# Patient Record
Sex: FEMALE | Race: WHITE | NOT HISPANIC OR LATINO | Employment: UNEMPLOYED | URBAN - METROPOLITAN AREA
[De-identification: names, ages, dates, MRNs, and addresses within clinical notes are randomized per-mention and may not be internally consistent; named-entity substitution may affect disease eponyms.]

---

## 2017-01-23 ENCOUNTER — HOSPITAL ENCOUNTER (EMERGENCY)
Facility: CLINIC | Age: 37
Discharge: HOME | End: 2017-01-24
Attending: EMERGENCY MEDICINE | Admitting: EMERGENCY MEDICINE
Payer: COMMERCIAL

## 2017-01-23 DIAGNOSIS — Z04.9 OBSERVATION OR EVALUATION FOR SUSPECTED CONDITION: ICD-10-CM

## 2017-01-23 DIAGNOSIS — K62.89 RECTAL DISCOMFORT: ICD-10-CM

## 2017-01-23 PROCEDURE — 99285 EMERGENCY DEPT VISIT HI MDM: CPT

## 2017-01-23 PROCEDURE — 99284 EMERGENCY DEPT VISIT MOD MDM: CPT | Mod: Z6 | Performed by: EMERGENCY MEDICINE

## 2017-01-23 NOTE — ED AVS SNAPSHOT
[facility], [place], Emergency Department    [address]    [facility] [address]    Phone:  [phone]    Fax:  [phone]                                       Rubi Sepulveda   MRN: [id number]    Department:  [facility], Emergency Department   Date of Visit:  1/23/2017           After Visit Summary Signature Page     I have received my discharge instructions, and my questions have been answered. I have discussed any challenges I see with this plan with the nurse or doctor.    ..........................................................................................................................................  Patient/Patient Representative Signature      ..........................................................................................................................................  Patient Representative Print Name and Relationship to Patient    ..................................................               ................................................  Date                                            Time    ..........................................................................................................................................  Reviewed by Signature/Title    ...................................................              ..............................................  Date                                                            Time
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
## 2017-01-24 VITALS
DIASTOLIC BLOOD PRESSURE: 76 MMHG | TEMPERATURE: 97.6 F | OXYGEN SATURATION: 100 % | SYSTOLIC BLOOD PRESSURE: 117 MMHG | RESPIRATION RATE: 16 BRPM | HEART RATE: 91 BPM | WEIGHT: 102.38 LBS

## 2017-01-24 LAB
HCG UR QL: NORMAL
INTERNAL QC OK POCT: YES

## 2017-01-24 PROCEDURE — 81025 URINE PREGNANCY TEST: CPT | Performed by: EMERGENCY MEDICINE

## 2017-01-24 PROCEDURE — 25000125 ZZHC RX 250: Performed by: EMERGENCY MEDICINE

## 2017-01-24 PROCEDURE — 25900017 H RX MED GY IP 259 OP 259 PS 637: Performed by: EMERGENCY MEDICINE

## 2017-01-24 PROCEDURE — 25000132 ZZH RX MED GY IP 250 OP 250 PS 637: Performed by: EMERGENCY MEDICINE

## 2017-01-24 RX ORDER — METRONIDAZOLE 500 MG/1
2000 TABLET ORAL ONCE
Status: COMPLETED | OUTPATIENT
Start: 2017-01-24 | End: 2017-01-24

## 2017-01-24 RX ORDER — AZITHROMYCIN 250 MG/1
1000 TABLET, FILM COATED ORAL ONCE
Status: COMPLETED | OUTPATIENT
Start: 2017-01-24 | End: 2017-01-24

## 2017-01-24 RX ORDER — CEFTRIAXONE SODIUM 1 G
250 VIAL (EA) INJECTION ONCE
Status: DISCONTINUED | OUTPATIENT
Start: 2017-01-24 | End: 2017-01-24 | Stop reason: HOSPADM

## 2017-01-24 RX ORDER — LIDOCAINE HYDROCHLORIDE 10 MG/ML
INJECTION, SOLUTION INFILTRATION; PERINEURAL
Status: DISCONTINUED
Start: 2017-01-24 | End: 2017-01-24 | Stop reason: HOSPADM

## 2017-01-24 RX ORDER — EMTRICITABINE AND TENOFOVIR DISOPROXIL FUMARATE 200; 300 MG/1; MG/1
1 TABLET, FILM COATED ORAL DAILY
Qty: 25 TABLET | Refills: 0 | Status: SHIPPED | OUTPATIENT
Start: 2017-01-24 | End: 2017-01-26

## 2017-01-24 RX ORDER — LEVONORGESTREL 1.5 MG/1
1.5 TABLET ORAL ONCE
Status: DISCONTINUED | OUTPATIENT
Start: 2017-01-24 | End: 2017-01-24 | Stop reason: CLARIF

## 2017-01-24 RX ORDER — ONDANSETRON 4 MG/1
4 TABLET, ORALLY DISINTEGRATING ORAL ONCE
Status: COMPLETED | OUTPATIENT
Start: 2017-01-24 | End: 2017-01-24

## 2017-01-24 RX ADMIN — AZITHROMYCIN 1000 MG: 250 TABLET, FILM COATED ORAL at 01:24

## 2017-01-24 RX ADMIN — ULIPRISTAL ACETATE 30 MG: 30 TABLET ORAL at 01:41

## 2017-01-24 RX ADMIN — Medication 1 PACKAGE: at 01:28

## 2017-01-24 RX ADMIN — METRONIDAZOLE 2000 MG: 500 TABLET ORAL at 01:25

## 2017-01-24 RX ADMIN — ONDANSETRON 4 MG: 4 TABLET, ORALLY DISINTEGRATING ORAL at 01:24

## 2017-01-24 ASSESSMENT — ENCOUNTER SYMPTOMS
DIFFICULTY URINATING: 0
VOMITING: 0
CONFUSION: 0
CONSTIPATION: 1
ARTHRALGIAS: 0
FEVER: 0
NAUSEA: 0
ANAL BLEEDING: 0
DIARRHEA: 0
BLOOD IN STOOL: 0
ABDOMINAL PAIN: 0
COUGH: 0
RECTAL PAIN: 1
COLOR CHANGE: 0
SHORTNESS OF BREATH: 0
CHILLS: 0
SORE THROAT: 0
HEADACHES: 0

## 2017-01-24 NOTE — ED NOTES
Patient refused rocephin injection at this time. [facility] nurse notified. Reeducated patient regarding medication use. Patient understands medication use and states she does not want it.
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
## 2017-01-25 ENCOUNTER — TELEPHONE (OUTPATIENT)
Dept: NURSING | Facility: CLINIC | Age: 37
End: 2017-01-25

## 2017-01-25 PROCEDURE — 99283 EMERGENCY DEPT VISIT LOW MDM: CPT | Mod: Z6 | Performed by: EMERGENCY MEDICINE

## 2017-01-25 PROCEDURE — 99283 EMERGENCY DEPT VISIT LOW MDM: CPT | Performed by: EMERGENCY MEDICINE

## 2017-01-26 ENCOUNTER — HOSPITAL ENCOUNTER (EMERGENCY)
Facility: CLINIC | Age: 37
Discharge: HOME | End: 2017-01-26
Attending: EMERGENCY MEDICINE | Admitting: EMERGENCY MEDICINE
Payer: COMMERCIAL

## 2017-01-26 VITALS
HEART RATE: 87 BPM | RESPIRATION RATE: 16 BRPM | DIASTOLIC BLOOD PRESSURE: 76 MMHG | WEIGHT: 110 LBS | SYSTOLIC BLOOD PRESSURE: 117 MMHG | OXYGEN SATURATION: 96 %

## 2017-01-26 DIAGNOSIS — T74.21XA SEXUAL ASSAULT OF ADULT, INITIAL ENCOUNTER: ICD-10-CM

## 2017-01-26 LAB
HCG UR QL: NEGATIVE
INTERNAL QC OK POCT: YES

## 2017-01-26 PROCEDURE — 81025 URINE PREGNANCY TEST: CPT | Performed by: EMERGENCY MEDICINE

## 2017-01-26 RX ORDER — EMTRICITABINE AND TENOFOVIR DISOPROXIL FUMARATE 200; 300 MG/1; MG/1
1 TABLET, FILM COATED ORAL DAILY
Qty: 25 TABLET | Refills: 0 | Status: ON HOLD | OUTPATIENT
Start: 2017-01-26 | End: 2022-03-17
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
## 2017-01-26 NOTE — ED AVS SNAPSHOT
[facility], [place], Emergency Department    [address]    [facility] [address]    Phone:  [phone]    Fax:  [phone]                                       Rubi Sepulveda   MRN: [id number]    Department:  [facility], Emergency Department   Date of Visit:  1/25/2017           After Visit Summary Signature Page     I have received my discharge instructions, and my questions have been answered. I have discussed any challenges I see with this plan with the nurse or doctor.    ..........................................................................................................................................  Patient/Patient Representative Signature      ..........................................................................................................................................  Patient Representative Print Name and Relationship to Patient    ..................................................               ................................................  Date                                            Time    ..........................................................................................................................................  Reviewed by Signature/Title    ...................................................              ..............................................  Date                                                            Time

## 2017-01-26 NOTE — TELEPHONE ENCOUNTER
Call Type: Triage Call    Presenting Problem: Caller asking about prescriptions written 1-24-17  class: Local Print.  Thought they would be given pills.  Triage Note:  Guideline Title: Medication Questions - Adult  Recommended Disposition: Speak with Provider or Pharmacist  within 24 hours  Original Inclination: Would have called clinic  Override Disposition:  Intended Action: Follow advice given  Physician Contacted: No  Has questions about prescribed and/or nonprescribed medications not covered by  available resources ?  YES  Pregnant and has medication questions regarding prescribed and/or nonprescribed  medication(s) not covered by available resources ? NO  Breastfeeding and has medication questions regarding prescribed and/or  nonprescribed medication(s) not covered by available resources ? NO  Sign(s) or symptom(s) associated with a diagnosed condition or with a new illness  ? NO  Prescription ordered today and not available at pharmacy putting patient at  clinical risk ? NO  Recurrence of a symptom(s) or illness post prescribed medication treatment AND  provider instructed patient to call if symptom(s) returned. ? NO  Unable to obtain prescribed medication related to available resources AND  situation poses immediate clinical risk ? NO  Pharmacy calling to clarify prescription order. ? NO  Requests refill of prescribed medication that does NOT have a valid refill; lack  of medication may cause clinical risk to patient if not available. ? NO  Requests refill of prescribed medication without valid refills OR requests refill  of prescribed medication with valid refills but does not have prescription number  (no RX container); lack of medication does not put patient at clinical risk ? NO  Physician Instructions:  Care Advice:
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
## 2017-01-26 NOTE — ED NOTES
Pt was seen here for SARS exam a few days ago, did not take brochure for follow up information.  Pt given SARS brochure and shown where contact numbers were on brochure for follow up.

## 2020-09-05 ENCOUNTER — AMBULATORY - HEALTHEAST (OUTPATIENT)
Dept: BEHAVIORAL HEALTH | Facility: CLINIC | Age: 40
End: 2020-09-05

## 2020-09-06 ENCOUNTER — COMMUNICATION - HEALTHEAST (OUTPATIENT)
Dept: SCHEDULING | Facility: CLINIC | Age: 40
End: 2020-09-06

## 2020-10-21 ENCOUNTER — COMMUNICATION - HEALTHEAST (OUTPATIENT)
Dept: SCHEDULING | Facility: CLINIC | Age: 40
End: 2020-10-21

## 2022-02-28 ENCOUNTER — HOSPITAL ENCOUNTER (EMERGENCY)
Facility: HOSPITAL | Age: 42
Discharge: HOME | End: 2022-02-28
Attending: EMERGENCY MEDICINE | Admitting: EMERGENCY MEDICINE
Payer: COMMERCIAL

## 2022-02-28 VITALS
BODY MASS INDEX: 21.68 KG/M2 | RESPIRATION RATE: 16 BRPM | HEART RATE: 87 BPM | TEMPERATURE: 97.8 F | DIASTOLIC BLOOD PRESSURE: 84 MMHG | WEIGHT: 127 LBS | OXYGEN SATURATION: 99 % | HEIGHT: 64 IN | SYSTOLIC BLOOD PRESSURE: 126 MMHG

## 2022-02-28 DIAGNOSIS — Z32.02 PREGNANCY EXAMINATION OR TEST, NEGATIVE RESULT: ICD-10-CM

## 2022-02-28 PROBLEM — F60.3 BORDERLINE PERSONALITY DISORDER (H): Status: ACTIVE | Noted: 2021-11-17

## 2022-02-28 PROBLEM — F19.10 SUBSTANCE ABUSE (H): Status: ACTIVE | Noted: 2021-01-02

## 2022-02-28 PROBLEM — R45.851 SUICIDAL IDEATION: Status: ACTIVE | Noted: 2020-09-05

## 2022-02-28 PROBLEM — F43.10 PTSD (POST-TRAUMATIC STRESS DISORDER): Status: ACTIVE | Noted: 2021-01-02

## 2022-02-28 PROBLEM — F31.2 BIPOLAR DISORDER, CURRENT EPISODE MANIC SEVERE WITH PSYCHOTIC FEATURES (H): Status: ACTIVE | Noted: 2019-03-08

## 2022-02-28 PROBLEM — F41.9 ANXIETY: Status: ACTIVE | Noted: 2018-04-10

## 2022-02-28 PROBLEM — F15.20 METHAMPHETAMINE USE DISORDER, SEVERE (H): Status: ACTIVE | Noted: 2021-11-17

## 2022-02-28 LAB
ALBUMIN UR-MCNC: 10 MG/DL
AMORPH CRY #/AREA URNS HPF: ABNORMAL /HPF
APPEARANCE UR: CLEAR
BILIRUB UR QL STRIP: NEGATIVE
COLOR UR AUTO: ABNORMAL
GLUCOSE UR STRIP-MCNC: NEGATIVE MG/DL
HCG UR QL: NEGATIVE
HGB UR QL STRIP: NEGATIVE
INTERNAL QC OK POCT: NORMAL
KETONES UR STRIP-MCNC: NEGATIVE MG/DL
LEUKOCYTE ESTERASE UR QL STRIP: ABNORMAL
MUCOUS THREADS #/AREA URNS LPF: PRESENT /LPF
NITRATE UR QL: NEGATIVE
PH UR STRIP: 7 [PH] (ref 5–7)
POCT KIT EXPIRATION DATE: NORMAL
POCT KIT LOT NUMBER: NORMAL
RBC URINE: 2 /HPF
SP GR UR STRIP: 1.03 (ref 1–1.03)
SQUAMOUS EPITHELIAL: 1 /HPF
UROBILINOGEN UR STRIP-MCNC: <2 MG/DL
WBC URINE: 8 /HPF

## 2022-02-28 PROCEDURE — 81025 URINE PREGNANCY TEST: CPT | Performed by: EMERGENCY MEDICINE

## 2022-02-28 PROCEDURE — 99283 EMERGENCY DEPT VISIT LOW MDM: CPT

## 2022-02-28 PROCEDURE — 81001 URINALYSIS AUTO W/SCOPE: CPT | Performed by: EMERGENCY MEDICINE

## 2022-02-28 ASSESSMENT — ENCOUNTER SYMPTOMS
FREQUENCY: 1
CONSTIPATION: 0
FEVER: 0
ABDOMINAL PAIN: 1
SHORTNESS OF BREATH: 0
DYSURIA: 0
BLOOD IN STOOL: 0
DIARRHEA: 0
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
## 2022-03-01 ENCOUNTER — PATIENT OUTREACH (OUTPATIENT)
Dept: CARE COORDINATION | Facility: CLINIC | Age: 42
End: 2022-03-01
Payer: COMMERCIAL

## 2022-03-01 DIAGNOSIS — Z71.89 OTHER SPECIFIED COUNSELING: ICD-10-CM

## 2022-03-01 NOTE — PROGRESS NOTES
Clinic Care Coordination Contact    Background: Care Coordination referral placed from hospitals discharge report for reason of patient meeting criteria for a TCM outreach call by [facility] team.    Assessment: Upon chart review, [facility] Team member will cancel/close the referral for TCM outreach due to reason below:    Patient does not have a valid phone number.    Plan: Care Coordination referral for TCM outreach canceled.    ESTHER Gale  [facility] [place], [facility]

## 2022-03-08 ENCOUNTER — HOSPITAL ENCOUNTER (EMERGENCY)
Facility: HOSPITAL | Age: 42
Discharge: PSYCHIATRIC OR SUBSTANCE USE FACILITY | End: 2022-03-09
Attending: EMERGENCY MEDICINE | Admitting: EMERGENCY MEDICINE
Payer: COMMERCIAL

## 2022-03-08 DIAGNOSIS — N30.00 ACUTE CYSTITIS WITHOUT HEMATURIA: ICD-10-CM

## 2022-03-08 DIAGNOSIS — R46.89 AGGRESSIVE BEHAVIOR: ICD-10-CM

## 2022-03-08 DIAGNOSIS — R45.86 LABILE MOOD: ICD-10-CM

## 2022-03-08 PROBLEM — F12.10 CANNABIS ABUSE: Status: ACTIVE | Noted: 2017-04-21

## 2022-03-08 LAB
ALBUMIN SERPL-MCNC: 3.6 G/DL (ref 3.5–5)
ALP SERPL-CCNC: 94 U/L (ref 45–120)
ALT SERPL W P-5'-P-CCNC: 22 U/L (ref 0–45)
ANION GAP SERPL CALCULATED.3IONS-SCNC: 13 MMOL/L (ref 5–18)
AST SERPL W P-5'-P-CCNC: 25 U/L (ref 0–40)
BILIRUB SERPL-MCNC: 0.6 MG/DL (ref 0–1)
BUN SERPL-MCNC: 11 MG/DL (ref 8–22)
CALCIUM SERPL-MCNC: 9.3 MG/DL (ref 8.5–10.5)
CHLORIDE BLD-SCNC: 104 MMOL/L (ref 98–107)
CK SERPL-CCNC: 323 U/L (ref 30–190)
CO2 SERPL-SCNC: 22 MMOL/L (ref 22–31)
CREAT SERPL-MCNC: 0.8 MG/DL (ref 0.6–1.1)
ERYTHROCYTE [DISTWIDTH] IN BLOOD BY AUTOMATED COUNT: 13.6 % (ref 10–15)
ETHANOL SERPL-MCNC: <10 MG/DL
GFR SERPL CREATININE-BSD FRML MDRD: >90 ML/MIN/1.73M2
GLUCOSE BLD-MCNC: 90 MG/DL (ref 70–125)
HCT VFR BLD AUTO: 42.7 % (ref 35–47)
HGB BLD-MCNC: 14.5 G/DL (ref 11.7–15.7)
HOLD SPECIMEN: NORMAL
HOLD SPECIMEN: NORMAL
MCH RBC QN AUTO: 31.3 PG (ref 26.5–33)
MCHC RBC AUTO-ENTMCNC: 34 G/DL (ref 31.5–36.5)
MCV RBC AUTO: 92 FL (ref 78–100)
PLATELET # BLD AUTO: 453 10E3/UL (ref 150–450)
POTASSIUM BLD-SCNC: 4 MMOL/L (ref 3.5–5)
PROT SERPL-MCNC: 6.8 G/DL (ref 6–8)
RBC # BLD AUTO: 4.63 10E6/UL (ref 3.8–5.2)
SODIUM SERPL-SCNC: 139 MMOL/L (ref 136–145)
WBC # BLD AUTO: 15.6 10E3/UL (ref 4–11)

## 2022-03-08 PROCEDURE — 80053 COMPREHEN METABOLIC PANEL: CPT | Performed by: EMERGENCY MEDICINE

## 2022-03-08 PROCEDURE — 96360 HYDRATION IV INFUSION INIT: CPT

## 2022-03-08 PROCEDURE — C9803 HOPD COVID-19 SPEC COLLECT: HCPCS

## 2022-03-08 PROCEDURE — 36415 COLL VENOUS BLD VENIPUNCTURE: CPT | Performed by: EMERGENCY MEDICINE

## 2022-03-08 PROCEDURE — 250N000013 HC RX MED GY IP 250 OP 250 PS 637: Performed by: EMERGENCY MEDICINE

## 2022-03-08 PROCEDURE — 258N000003 HC RX IP 258 OP 636: Performed by: EMERGENCY MEDICINE

## 2022-03-08 PROCEDURE — 82077 ASSAY SPEC XCP UR&BREATH IA: CPT | Performed by: EMERGENCY MEDICINE

## 2022-03-08 PROCEDURE — 99285 EMERGENCY DEPT VISIT HI MDM: CPT | Mod: 25

## 2022-03-08 PROCEDURE — 96361 HYDRATE IV INFUSION ADD-ON: CPT

## 2022-03-08 PROCEDURE — 85014 HEMATOCRIT: CPT | Performed by: EMERGENCY MEDICINE

## 2022-03-08 PROCEDURE — 82550 ASSAY OF CK (CPK): CPT | Performed by: EMERGENCY MEDICINE

## 2022-03-08 RX ORDER — SODIUM CHLORIDE 9 MG/ML
INJECTION, SOLUTION INTRAVENOUS ONCE
Status: COMPLETED | OUTPATIENT
Start: 2022-03-08 | End: 2022-03-09

## 2022-03-08 RX ORDER — ARIPIPRAZOLE 10 MG/1
10 TABLET ORAL ONCE
Status: COMPLETED | OUTPATIENT
Start: 2022-03-08 | End: 2022-03-08

## 2022-03-08 RX ADMIN — SODIUM CHLORIDE: 9 INJECTION, SOLUTION INTRAVENOUS at 20:42

## 2022-03-08 RX ADMIN — ARIPIPRAZOLE 10 MG: 10 TABLET ORAL at 20:42

## 2022-03-09 ENCOUNTER — HOSPITAL ENCOUNTER (INPATIENT)
Facility: CLINIC | Age: 42
LOS: 8 days | Discharge: NURSING, REHAB OR LONG-TERM CARE | DRG: 885 | End: 2022-03-17
Attending: PSYCHIATRY & NEUROLOGY | Admitting: PSYCHIATRY & NEUROLOGY
Payer: COMMERCIAL

## 2022-03-09 ENCOUNTER — TELEPHONE (OUTPATIENT)
Dept: BEHAVIORAL HEALTH | Facility: CLINIC | Age: 42
End: 2022-03-09
Payer: COMMERCIAL

## 2022-03-09 VITALS
DIASTOLIC BLOOD PRESSURE: 71 MMHG | OXYGEN SATURATION: 98 % | HEART RATE: 91 BPM | HEIGHT: 64 IN | TEMPERATURE: 98.1 F | WEIGHT: 127 LBS | BODY MASS INDEX: 21.68 KG/M2 | SYSTOLIC BLOOD PRESSURE: 110 MMHG | RESPIRATION RATE: 20 BRPM

## 2022-03-09 DIAGNOSIS — F25.9 SCHIZOPHRENIA, SCHIZOAFFECTIVE, CHRONIC WITH ACUTE EXACERBATION (H): Primary | ICD-10-CM

## 2022-03-09 LAB
ALBUMIN UR-MCNC: 30 MG/DL
AMPHETAMINES UR QL SCN: ABNORMAL
APPEARANCE UR: ABNORMAL
BARBITURATES UR QL: ABNORMAL
BENZODIAZ UR QL: ABNORMAL
BILIRUB UR QL STRIP: NEGATIVE
CANNABINOIDS UR QL SCN: ABNORMAL
COCAINE UR QL: ABNORMAL
COLOR UR AUTO: YELLOW
CREAT UR-MCNC: 171 MG/DL
GLUCOSE UR STRIP-MCNC: NEGATIVE MG/DL
HGB UR QL STRIP: ABNORMAL
HYALINE CASTS: 8 /LPF
KETONES UR STRIP-MCNC: ABNORMAL MG/DL
LEUKOCYTE ESTERASE UR QL STRIP: ABNORMAL
METHADONE UR QL SCN: ABNORMAL
MUCOUS THREADS #/AREA URNS LPF: PRESENT /LPF
NITRATE UR QL: NEGATIVE
OPIATES UR QL SCN: ABNORMAL
OXYCODONE UR QL: ABNORMAL
PCP UR QL SCN: ABNORMAL
PH UR STRIP: 6 [PH] (ref 5–7)
RBC URINE: 22 /HPF
SARS-COV-2 RNA RESP QL NAA+PROBE: NEGATIVE
SP GR UR STRIP: 1.02 (ref 1–1.03)
SQUAMOUS EPITHELIAL: 36 /HPF
UROBILINOGEN UR STRIP-MCNC: <2 MG/DL
WBC URINE: 116 /HPF

## 2022-03-09 PROCEDURE — 250N000013 HC RX MED GY IP 250 OP 250 PS 637: Performed by: EMERGENCY MEDICINE

## 2022-03-09 PROCEDURE — 128N000001 HC R&B CD/MH ADULT

## 2022-03-09 PROCEDURE — 80307 DRUG TEST PRSMV CHEM ANLYZR: CPT | Performed by: EMERGENCY MEDICINE

## 2022-03-09 PROCEDURE — 87635 SARS-COV-2 COVID-19 AMP PRB: CPT | Performed by: EMERGENCY MEDICINE

## 2022-03-09 PROCEDURE — 81003 URINALYSIS AUTO W/O SCOPE: CPT | Performed by: EMERGENCY MEDICINE

## 2022-03-09 PROCEDURE — 87086 URINE CULTURE/COLONY COUNT: CPT | Performed by: EMERGENCY MEDICINE

## 2022-03-09 RX ORDER — NITROFURANTOIN 25; 75 MG/1; MG/1
100 CAPSULE ORAL EVERY 12 HOURS SCHEDULED
Status: DISCONTINUED | OUTPATIENT
Start: 2022-03-09 | End: 2022-03-09 | Stop reason: HOSPADM

## 2022-03-09 RX ORDER — HYDROXYZINE HYDROCHLORIDE 25 MG/1
25 TABLET, FILM COATED ORAL 3 TIMES DAILY PRN
Status: DISCONTINUED | OUTPATIENT
Start: 2022-03-09 | End: 2022-03-10

## 2022-03-09 RX ORDER — HALOPERIDOL 5 MG/ML
2 INJECTION INTRAMUSCULAR EVERY 6 HOURS PRN
Status: DISCONTINUED | OUTPATIENT
Start: 2022-03-09 | End: 2022-03-17 | Stop reason: HOSPADM

## 2022-03-09 RX ORDER — NITROFURANTOIN MACROCRYSTAL 100 MG
100 CAPSULE ORAL EVERY 12 HOURS
Status: ON HOLD | COMMUNITY
End: 2022-03-17

## 2022-03-09 RX ORDER — HALOPERIDOL 1 MG/1
2 TABLET ORAL EVERY 6 HOURS PRN
Status: DISCONTINUED | OUTPATIENT
Start: 2022-03-09 | End: 2022-03-17 | Stop reason: HOSPADM

## 2022-03-09 RX ADMIN — NITROFURANTOIN MONOHYDRATE/MACROCRYSTALLINE 100 MG: 25; 75 CAPSULE ORAL at 19:01

## 2022-03-09 RX ADMIN — NITROFURANTOIN MONOHYDRATE/MACROCRYSTALLINE 100 MG: 25; 75 CAPSULE ORAL at 07:11

## 2022-03-09 ASSESSMENT — ACTIVITIES OF DAILY LIVING (ADL)
WEAR_GLASSES_OR_BLIND: NO
CONCENTRATING,_REMEMBERING_OR_MAKING_DECISIONS_DIFFICULTY: NO
DOING_ERRANDS_INDEPENDENTLY_DIFFICULTY: NO
FALL_HISTORY_WITHIN_LAST_SIX_MONTHS: NO
CHANGE_IN_FUNCTIONAL_STATUS_SINCE_ONSET_OF_CURRENT_ILLNESS/INJURY: NO
DRESSING/BATHING_DIFFICULTY: OTHER (SEE COMMENTS)
WALKING_OR_CLIMBING_STAIRS_DIFFICULTY: NO
TOILETING_ISSUES: NO
DIFFICULTY_EATING/SWALLOWING: NO

## 2022-03-09 NOTE — ED NOTES
Florence from DEC called for report before video conf. In with pt for behavioral health consult.

## 2022-03-09 NOTE — PLAN OF CARE
"Patient slept most of shift. Up for breakfast and lunch and then went back to bed. Patient eating and drinking well, SBA assist to the bathroom with void x3. Patient allowed cares ie blood pressure to be taken, head to toe assessment. Deflected most questions being asked and seemed to become more anxious with any probing questions. Let needs be known, asked to use restroom, asked for blankets and juice. Has dry cough and some nasal congestion. Afebrile, HR tachycardic 100-115, BP WNL. Complained of Lip pain and asked for ice cream. Rn asked to assess inside of mouth patient declined and stated \"no I am fine now\" and refused assessment. Skin on lip seems externally intact.                      "
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
## 2022-03-09 NOTE — ED PROVIDER NOTES
"EMERGENCY DEPARTMENT ENCOUNTER      NAME: Rubi Sepulveda  AGE: 42 year old female  YOB: 1980  MRN: [id number]  EVALUATION DATE & TIME: 3/8/2022  7:48 PM    PCP: No Ref-Primary, Physician    ED PROVIDER: Pako Burris M.D.      Chief Complaint   Patient presents with     Altered Mental Status       FINAL IMPRESSION:  Mood disorder    ED COURSE & MEDICAL DECISION MAKING:    Pertinent Labs & Imaging studies reviewed. (See chart for details)  42 year old female presents to the Emergency Department for evaluation of confusion.  Per report patient was noticed to be acting erratically at a local gas station.  PD was called and they referred the case to EMS.  Patient arrives reporting \"I forgotten everything\".  Patient goes on to relate that she does not recall much of the last 5 years of her life.  She cannot say where she came from or why she is here.  Patient does admit to prior methamphetamine use but reports none for a number of days.  She does agree with reports of prior bipolar disorder.  Uncertain if she has been taking her meds.  Patient agrees with assessment that she is slightly out of control potentially consistent with her bipolar disorder.  She reports Abilify typically works when she has problems.  On exam she is very tangent and slightly agitated.  She can be refocused fairly easily.  No overt hallucinations.  We will proceed with Abilify, baseline blood work and drug screen.  Review of records indicate recent similar presentation at outside hospital related to methamphetamine use.  Patient responded well to Zyprexa and was dismissed after resting.  No outward signs of injury to suggest need for imaging    7:49 PM I met with the patient for the initial interview and physical examination. Discussed plan for treatment and workup in the ED.  9:56 PM Rechecked on patient. Updated on results. She is much calmer, lying on her side in a position of comfort.  10:45 PM.  Patient be monitored through " "the night.  Will require reevaluation in the morning potential DEC evaluation if she remains disjointed.  Laboratory evaluation reassuring.  CPK mildly elevated could relate to hypomania versus methamphetamine use.  No evidence of renal dysfunction.  11:20 PM.  Patient discussed with Dr. Escobar.  She is assuming care.  Patient will require reevaluation the morning    At the conclusion of the encounter I discussed the results of all of the tests and the disposition. The questions were answered and return precautions provided. The patient or family acknowledged understanding and was agreeable with the care plan.       PPE: Provider wore gloves, N95 mask, eye protection, surgical cap.     MEDICATIONS GIVEN IN THE EMERGENCY:  Medications   ARIPiprazole (ABILIFY) tablet 10 mg (10 mg Oral Given 3/8/22 2042)   sodium chloride 0.9% infusion ( Intravenous Rate/Dose Verify 3/8/22 2212)       NEW PRESCRIPTIONS STARTED AT TODAY'S ER VISIT  New Prescriptions    No medications on file     =================================================================    HPI    History limited by altered mental status.  Patient information was obtained from: Patient, RN    Use of Intrepreter: N/A      Rubi Sepulveda is a 42 year old female with a pertient medical history of anxiety, schizoaffective schizophrenia, cannabis abuse, methamphetamine abuse, who presents to the ED for evaluation of altered mental status. Patient presents by EMS. She was seen acting erratically at a gas station and the  were called. EMS was dispatched for a mental health concern to bring patient into the ED. Patient states that she \"can't remember at least the last 5 years of my life.\" She does know that she was brought in by EMS. When asked how she has been sleeping recently, she states \"I don't know.\" Endorses methamphetamine once within the last 2 weeks. Patient is a confused historian and has trouble with her chain of thought.      REVIEW OF SYSTEMS   ROS " unable to attain due to altered mental status.    PAST MEDICAL HISTORY:  No past medical history on file.    PAST SURGICAL HISTORY:  No past surgical history on file.      CURRENT MEDICATIONS:    No current facility-administered medications for this encounter.    Current Outpatient Medications:      ARIPiprazole (ABILIFY) 10 MG tablet, Take 1 tablet (10 mg) by mouth daily, Disp: 30 tablet, Rfl: 0     clonazePAM (KLONOPIN) 0.5 MG tablet, Take 1 tablet (0.5 mg) by mouth 2 times daily, Disp: 60 tablet, Rfl: 0     dolutegravir (TIVICAY) 50 MG tablet, Take 1 tablet (50 mg) by mouth daily, Disp: 25 tablet, Rfl: 0     emtricitabine-tenofovir (TRUVADA) 200-300 MG per tablet, Take 1 tablet by mouth daily, Disp: 25 tablet, Rfl: 0     QUEtiapine (SEROQUEL) 400 MG tablet, Take 2.5 tablets (1000 mg) by mouth every night at bedtime., Disp: 75 tablet, Rfl: 0    ALLERGIES:  Allergies   Allergen Reactions     Ibuprofen Nausea and Vomiting     Seasonal Allergies        FAMILY HISTORY:  No family history on file.    SOCIAL HISTORY:   Social History     Socioeconomic History     Marital status: Single     Spouse name: Not on file     Number of children: Not on file     Years of education: Not on file     Highest education level: Not on file   Occupational History     Not on file   Tobacco Use     Smoking status: Unknown If Ever Smoked     Smokeless tobacco: Not on file   Substance and Sexual Activity     Alcohol use: Yes     Alcohol/week: 3.0 standard drinks     Drug use: Not Currently     Types: Other     Comment: Drug use: ASHVIN     Sexual activity: Yes     Partners: Male     Birth control/protection: None   Other Topics Concern     Not on file   Social History Narrative     Not on file     Social Determinants of Health     Financial Resource Strain: Not on file   Food Insecurity: Not on file   Transportation Needs: Not on file   Physical Activity: Not on file   Stress: Not on file   Social Connections: Not on file   Intimate Partner  "Violence: Not on file   Housing Stability: Not on file       VITALS:  Patient Vitals for the past 24 hrs:   BP Temp Temp src Pulse Resp SpO2 Height Weight   03/08/22 2000 136/71 -- -- 109 -- 98 % -- --   03/08/22 1954 117/86 98.2  F (36.8  C) Oral 107 16 98 % 1.626 m (5' 4\") 57.6 kg (127 lb)        PHYSICAL EXAM    Constitutional:  Awake, alert, in no apparent distress  HENT:  Normocephalic, Atraumatic. Bilateral external ears normal. Oropharynx moist. Nose normal. Neck- Normal range of motion with no guarding, No midline cervical tenderness, Supple, No stridor.   Eyes:  PERRL, EOMI with no signs of entrapment, Conjunctiva injected, No discharge.   Respiratory:  Normal breath sounds, No respiratory distress, No wheezing.    Cardiovascular:  Normal heart rate, Normal rhythm, No appreciable rubs or gallops.   GI:  Soft, No tenderness, No distension, No palpable masses  Musculoskeletal:  Intact distal pulses, No edema. Good range of motion in all major joints. No tenderness to palpation or major deformities noted.  Integument:  Warm, Dry, No erythema, No rash. Face slightly flushed.  Neurologic:  Alert to person, Normal motor function, Normal sensory function, No focal deficits noted.   Psychiatric:   Pressured speech. Chain of thought is fleeting. Patient is a confused historian, though she does have some insight.  Minimal paranoia      LAB:  All pertinent labs reviewed and interpreted.  Results for orders placed or performed during the hospital encounter of 03/08/22   Comprehensive metabolic panel     Status: Normal   Result Value Ref Range    Sodium 139 136 - 145 mmol/L    Potassium 4.0 3.5 - 5.0 mmol/L    Chloride 104 98 - 107 mmol/L    Carbon Dioxide (CO2) 22 22 - 31 mmol/L    Anion Gap 13 5 - 18 mmol/L    Urea Nitrogen 11 8 - 22 mg/dL    Creatinine 0.80 0.60 - 1.10 mg/dL    Calcium 9.3 8.5 - 10.5 mg/dL    Glucose 90 70 - 125 mg/dL    Alkaline Phosphatase 94 45 - 120 U/L    AST 25 0 - 40 U/L    ALT 22 0 - 45 U/L "    Protein Total 6.8 6.0 - 8.0 g/dL    Albumin 3.6 3.5 - 5.0 g/dL    Bilirubin Total 0.6 0.0 - 1.0 mg/dL    GFR Estimate >90 >60 mL/min/1.73m2   CBC (+ platelets, no diff)     Status: Abnormal   Result Value Ref Range    WBC Count 15.6 (H) 4.0 - 11.0 10e3/uL    RBC Count 4.63 3.80 - 5.20 10e6/uL    Hemoglobin 14.5 11.7 - 15.7 g/dL    Hematocrit 42.7 35.0 - 47.0 %    MCV 92 78 - 100 fL    MCH 31.3 26.5 - 33.0 pg    MCHC 34.0 31.5 - 36.5 g/dL    RDW 13.6 10.0 - 15.0 %    Platelet Count 453 (H) 150 - 450 10e3/uL   Alcohol level blood     Status: Normal   Result Value Ref Range    Alcohol, Blood <10 None detected mg/dL   CK total     Status: Abnormal   Result Value Ref Range     (H) 30 - 190 U/L   Urine Drugs of Abuse Screen Panel 1+ - Drug Screen plus Methadone     Status: None ()    Narrative    The following orders were created for panel order Urine Drugs of Abuse Screen Panel 1+ - Drug Screen plus Methadone.  Procedure                               Abnormality         Status                     ---------                               -----------         ------                     Drugs of Abuse 1+ Panel,...[[id number]]                                                   Please view results for these tests on the individual orders.       RADIOLOGY:  Reviewed all pertinent imaging. Please see official radiology report.  No orders to display       I, Tee Anderson, am serving as a scribe to document services personally performed by Pako Burris MD, based on my observation and the provider's statements to me. I, Pako Burris MD attest that Tee Anderson is acting in a scribe capacity, has observed my performance of the services and has documented them in accordance with my direction.    Pako Burris M.D.  Emergency Medicine  [facility] EMERGENCY DEPARTMENT     Pako Burris MD  03/08/22 7606

## 2022-03-09 NOTE — ED NOTES
andres called. She stated she called dec and no one took collateral from her. She has called er 3 times this am to give information. Gave her number to dec again to try and give information. Let her know that dec would have more information about placement then I do. She is going to try dec again.
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
## 2022-03-09 NOTE — TELEPHONE ENCOUNTER
"S: 06:03 - Received call from On-call supervisor, Beatrice to approve a DEC bypass on a 42/F in [place] ED for keke. Follow-up questions/clinical from Dr. Escobar, ED MD    B: BIB EMS after pt was found acting erratically at a local gas station. Pt presents to the ED as confused and agitated w/ tangential and pressured speech. Pt reports she \"can't remember the last 5 years of my life.\" Unclear if pt has been med-compliant. Pt reports meth use within the last 2 weeks - UDS positive for amphetamines and cannabis. Pt has an ACT team. Most recent IP admission at [facility] in Sept of 2020. No known acute medical concerns. Hx of schizoaffective d/o, bipolar d/o, TIMBO, BPD and PTSD.     B: Vol     UA: See chart - abnormal results  Urine Culture processing  CK Total 323  CMP WNL  CBC: WBC 15.6 / Platelet Count 453  Covid test needs to be ordered and collected    R: Called ED @ 06:40 re: Covid test collection and follow-up questions. Spoke w/ Dr. Escobar, who reports pt medically cleared for [facility] admission and that pt WBC slightly elevated and will treat pt for UTI as UA abnormal. RN reports that pt is able to ambulate, eat and drink independently. ED to order/collect Covid test. Pt placed on work list until appropriate placement is available     Patient cleared and ready for behavioral bed placement: Yes            "
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
## 2022-03-09 NOTE — ED NOTES
Requested Urine sample from Pt.  Pt does not have to urinate at this time.  PT received 693 mL of NS, drank 8 oz of milk, this should help facilitate a urine sample.

## 2022-03-09 NOTE — ED NOTES
1:1 continued with pt. Pt became agitated with DEC , yelling and began pushing iPad across room. Hung up call with  and laid back down in bed.

## 2022-03-09 NOTE — ED TRIAGE NOTES
Pt brought in by [facility] for erratic behavior.  Per EMS report Pt acting erratic per witness statements EMS dispatched to have PT evaluated at hospital.

## 2022-03-09 NOTE — ED NOTES
Bed: JNED-20  Expected date: 3/8/22  Expected time: 7:37 PM  Means of arrival: Ambulance  Comments:  Sheyla - Psych

## 2022-03-09 NOTE — ED NOTES
Pt ate breakfast and lunch. Sitter verbalized no concerns. Pt resting comfortably each time rounded during shift.

## 2022-03-09 NOTE — PROGRESS NOTES
"Olinda from ACT Team at [facility] located in [place] called since she currently works with Rubi. They have been working with her for her issues with housing and with substance abuse. They are \"hopeful she can be admitted for psych cares. Please call me for any questions or discharge planning needs\".    Olinda Rodríguez Cell [phone].  "

## 2022-03-09 NOTE — PLAN OF CARE
Problem: UTI (Urinary Tract Infection)  Goal: Improved Infection Symptoms  Outcome: Ongoing, Progressing     Problem: Activity and Energy Impairment (Anxiety Signs/Symptoms)  Goal: Optimized Energy Level (Anxiety Signs/Symptoms)  Outcome: Ongoing, Progressing     Problem: Cognitive Impairment (Anxiety Signs/Symptoms)  Goal: Optimized Cognitive Function (Anxiety Signs/Symptoms)  Outcome: Ongoing, Progressing  Flowsheets (Taken 3/9/2022 1019)  Mutually Determined Action Steps (Optimized Cognitive Function): participates in cognitive restructuring     Problem: Mood Impairment (Anxiety Signs/Symptoms)  Goal: Improved Mood Symptoms (Anxiety Signs/Symptoms)  Outcome: Ongoing, Progressing  Flowsheets (Taken 3/9/2022 1019)  Mutually Determined Action Steps (Improved Mood Symptoms):   names internal triggers   names external triggers     Problem: Sleep Impairment (Anxiety Signs/Symptoms)  Goal: Improved Sleep (Anxiety Signs/Symptoms)  Outcome: Ongoing, Progressing  Flowsheets (Taken 3/9/2022 1019)  Mutually Determined Action Steps (Improved Sleep): identifies disturbance factors     Problem: Somatic Disturbance (Anxiety Signs/Symptoms)  Goal: Improved Somatic Symptoms (Anxiety Signs/Symptoms)  Outcome: Ongoing, Progressing  Flowsheets (Taken 3/9/2022 1019)  Mutually Determined Action Steps (Improved Somatic Symptoms): rates anxiety level via scale   Goal Outcome Evaluation:  Patient calm and cooperative at this time. Up to bedside to eat breakfast, SBA to bathroom. Making needs known and asking for help currently.

## 2022-03-09 NOTE — ED PROVIDER NOTES
"[facility] ED Mental Health Handoff Note:     Assuming care from: Dr Bryanna Escobar    Brief HPI: 42 year old female signed out to me by the above provider. See initial ED Provider note for full details of the presentation.   In brief, patient with a pertient medical history of anxiety, schizoaffective schizophrenia, cannabis abuse, methamphetamine abuse, who presents to the ED for evaluation of altered mental status. Patient presents by EMS. She was seen acting erratically at a gas station and the  were called. EMS was dispatched for a mental health concern to bring patient into the ED. Patient states that she \"can't remember at least the last 5 years of my life.\" She does know that she was brought in by EMS. When asked how she has been sleeping recently, she states \"I don't know.\" Endorses methamphetamine once within the last 2 weeks. Patient is a confused historian and has trouble with her chain of thought.      Home meds reviewed and ordered/administered: Yes  Medically stable for inpatient mental health admission: Yes.  Evaluated by mental health: No- unable to participate.  DEC bypass  Safety concerns: At the time I received sign out, there were no safety concerns.    Hold Status:  Active Orders   N/A     ED Course:  7:10 AM signout from Dr. Escobar  *** patient signed out to oncoming physician.     Impression:    ICD-10-CM    1. Aggressive behavior  R46.89    2. Labile mood  R45.86    3. Acute cystitis without hematuria  N30.00           Labs/Imaging:   Results for orders placed or performed during the hospital encounter of 03/08/22 (from the past 24 hour(s))   Comprehensive metabolic panel     Status: Normal    Collection Time: 03/08/22  9:10 PM   Result Value Ref Range    Sodium 139 136 - 145 mmol/L    Potassium 4.0 3.5 - 5.0 mmol/L    Chloride 104 98 - 107 mmol/L    Carbon Dioxide (CO2) 22 22 - 31 mmol/L    Anion Gap 13 5 - 18 mmol/L    Urea Nitrogen 11 8 - 22 mg/dL    Creatinine 0.80 0.60 - 1.10 " mg/dL    Calcium 9.3 8.5 - 10.5 mg/dL    Glucose 90 70 - 125 mg/dL    Alkaline Phosphatase 94 45 - 120 U/L    AST 25 0 - 40 U/L    ALT 22 0 - 45 U/L    Protein Total 6.8 6.0 - 8.0 g/dL    Albumin 3.6 3.5 - 5.0 g/dL    Bilirubin Total 0.6 0.0 - 1.0 mg/dL    GFR Estimate >90 >60 mL/min/1.73m2   CBC (+ platelets, no diff)     Status: Abnormal    Collection Time: 03/08/22  9:10 PM   Result Value Ref Range    WBC Count 15.6 (H) 4.0 - 11.0 10e3/uL    RBC Count 4.63 3.80 - 5.20 10e6/uL    Hemoglobin 14.5 11.7 - 15.7 g/dL    Hematocrit 42.7 35.0 - 47.0 %    MCV 92 78 - 100 fL    MCH 31.3 26.5 - 33.0 pg    MCHC 34.0 31.5 - 36.5 g/dL    RDW 13.6 10.0 - 15.0 %    Platelet Count 453 (H) 150 - 450 10e3/uL   Alcohol level blood     Status: Normal    Collection Time: 03/08/22  9:10 PM   Result Value Ref Range    Alcohol, Blood <10 None detected mg/dL   CK total     Status: Abnormal    Collection Time: 03/08/22  9:10 PM   Result Value Ref Range     (H) 30 - 190 U/L   Eugene Draw     Status: None    Collection Time: 03/08/22  9:10 PM    Narrative    The following orders were created for panel order Eugene Draw.  Procedure                               Abnormality         Status                     ---------                               -----------         ------                     Extra Blue Top Tube[[id number]]                              Final result               Extra Red Top Tube[[id number]]                               Final result                 Please view results for these tests on the individual orders.   Extra Blue Top Tube     Status: None    Collection Time: 03/08/22  9:10 PM   Result Value Ref Range    Hold Specimen JIC    Extra Red Top Tube     Status: None    Collection Time: 03/08/22  9:10 PM   Result Value Ref Range    Hold Specimen JIC    Urine Drugs of Abuse Screen Panel 1+ - Drug Screen plus Methadone     Status: Abnormal    Collection Time: 03/09/22  5:20 AM    Narrative    The following orders were  created for panel order Urine Drugs of Abuse Screen Panel 1+ - Drug Screen plus Methadone.  Procedure                               Abnormality         Status                     ---------                               -----------         ------                     Drugs of Abuse 1+ Panel,...[[id number]]  Abnormal            Final result                 Please view results for these tests on the individual orders.   Drugs of Abuse 1+ Panel, Urine ([facility] Only)     Status: Abnormal    Collection Time: 03/09/22  5:20 AM   Result Value Ref Range    Amphetamines Urine Screen Positive (A) Screen Negative    Benzodiazepines Urine Screen Negative Screen Negative    Opiates Urine Screen Negative Screen Negative    PCP Urine Screen Negative Screen Negative    Cannabinoids Urine Screen Positive (A) Screen Negative    Barbiturates Urine Screen Negative Screen Negative    Cocaine Urine Screen Negative Screen Negative    Methadone Urine Screen Negative Screen Negative    Oxycodone Urine Screen Negative Screen Negative    Creatinine Urine mg/dL 171 mg/dL    Narrative    Drug                           Screening Threshold    Amphetamines                    1000 ng/mL  Benzodiazepine                   200 ng/mL  Opiates                          300 ng/mL  Phencyclidine                     25 ng/mL  THC Metabolite                    50 ng/mL  Barbiturates                     200 ng/mL  Cocaine Metabolite               150 ng/mL  Methadone                        300 ng/mL  Oxycodone                        100 ng/mL    Screening results are to be used only for medical purposes.  Unconfirmed screening results are not to be used for non-  medical purposes.   UA with Microscopic reflex to Culture     Status: Abnormal    Collection Time: 03/09/22  5:20 AM    Specimen: Urine, Midstream   Result Value Ref Range    Color Urine Yellow Colorless, Straw, Light Yellow, Yellow    Appearance Urine Turbid (A) Clear    Glucose Urine Negative Negative  mg/dL    Bilirubin Urine Negative Negative    Ketones Urine Trace (A) Negative mg/dL    Specific Gravity Urine 1.024 1.001 - 1.030    Blood Urine 0.03 mg/dL (A) Negative    pH Urine 6.0 5.0 - 7.0    Protein Albumin Urine 30  (A) Negative mg/dL    Urobilinogen Urine <2.0 <2.0 mg/dL    Nitrite Urine Negative Negative    Leukocyte Esterase Urine 500 Fabi/uL (A) Negative    Mucus Urine Present (A) None Seen /LPF    RBC Urine 22 (H) <=2 /HPF    WBC Urine 116 (H) <=5 /HPF    Squamous Epithelials Urine 36 (H) <=1 /HPF    Hyaline Casts Urine 8 (H) <=2 /LPF    Narrative    Urine Culture ordered based on laboratory criteria   Asymptomatic COVID-19 Virus (Coronavirus) by PCR Nose     Status: Normal    Collection Time: 03/09/22  6:32 AM    Specimen: Nose; Swab   Result Value Ref Range    SARS CoV2 PCR Negative Negative    Narrative    Testing was performed using the mayur  SARS-CoV-2 & Influenza A/B Assay on the mayur  Estefani  System.  This test should be ordered for the detection of SARS-COV-2 in individuals who meet SARS-CoV-2 clinical and/or epidemiological criteria. Test performance is unknown in asymptomatic patients.  This test is for in vitro diagnostic use under the FDA EUA for laboratories certified under CLIA to perform moderate and/or high complexity testing. This test has not been FDA cleared or approved.  A negative test does not rule out the presence of PCR inhibitors in the specimen or target RNA in concentration below the limit of detection for the assay. The possibility of a false negative should be considered if the patient's recent exposure or clinical presentation suggests COVID-19.  [facility] are certified under the Clinical Laboratory Improvement Amendments of 1988 (CLIA-88) as qualified to perform moderate and/or high complexity laboratory testing.         ED Meds:  Medications   nitroFURantoin macrocrystal-monohydrate (MACROBID) capsule 100 mg (100 mg Oral Given 3/9/22 0711)    ARIPiprazole (ABILIFY) tablet 10 mg (10 mg Oral Given 3/8/22 2042)   sodium chloride 0.9% infusion (0 mLs Intravenous Stopped 3/9/22 0309)     No orders to display     Allan Aguayo MD  [facility] EMERGENCY DEPARTMENT  [facility] [facility] [id number] [phone]

## 2022-03-10 PROBLEM — F25.9 SCHIZOPHRENIA, SCHIZOAFFECTIVE, CHRONIC WITH ACUTE EXACERBATION (H): Status: ACTIVE | Noted: 2022-03-10

## 2022-03-10 LAB — BACTERIA UR CULT: NORMAL

## 2022-03-10 PROCEDURE — 99232 SBSQ HOSP IP/OBS MODERATE 35: CPT

## 2022-03-10 PROCEDURE — 128N000001 HC R&B CD/MH ADULT

## 2022-03-10 PROCEDURE — 99222 1ST HOSP IP/OBS MODERATE 55: CPT | Performed by: PSYCHIATRY & NEUROLOGY

## 2022-03-10 PROCEDURE — 250N000013 HC RX MED GY IP 250 OP 250 PS 637: Performed by: PSYCHIATRY & NEUROLOGY

## 2022-03-10 RX ORDER — NITROFURANTOIN 25; 75 MG/1; MG/1
100 CAPSULE ORAL EVERY 12 HOURS SCHEDULED
Status: DISCONTINUED | OUTPATIENT
Start: 2022-03-10 | End: 2022-03-10 | Stop reason: CLARIF

## 2022-03-10 RX ORDER — OLANZAPINE 10 MG/1
10 TABLET ORAL 3 TIMES DAILY PRN
Status: DISCONTINUED | OUTPATIENT
Start: 2022-03-10 | End: 2022-03-17 | Stop reason: HOSPADM

## 2022-03-10 RX ORDER — ACETAMINOPHEN 325 MG/1
650 TABLET ORAL EVERY 4 HOURS PRN
Status: DISCONTINUED | OUTPATIENT
Start: 2022-03-10 | End: 2022-03-17 | Stop reason: HOSPADM

## 2022-03-10 RX ORDER — ARIPIPRAZOLE 10 MG/1
10 TABLET ORAL DAILY
Status: DISCONTINUED | OUTPATIENT
Start: 2022-03-10 | End: 2022-03-11

## 2022-03-10 RX ORDER — HYDROXYZINE HYDROCHLORIDE 25 MG/1
25 TABLET, FILM COATED ORAL EVERY 4 HOURS PRN
Status: DISCONTINUED | OUTPATIENT
Start: 2022-03-10 | End: 2022-03-17 | Stop reason: HOSPADM

## 2022-03-10 RX ORDER — POLYETHYLENE GLYCOL 3350 17 G/17G
17 POWDER, FOR SOLUTION ORAL DAILY PRN
Status: DISCONTINUED | OUTPATIENT
Start: 2022-03-10 | End: 2022-03-17 | Stop reason: HOSPADM

## 2022-03-10 RX ORDER — TRAZODONE HYDROCHLORIDE 50 MG/1
50 TABLET, FILM COATED ORAL
Status: DISCONTINUED | OUTPATIENT
Start: 2022-03-10 | End: 2022-03-17 | Stop reason: HOSPADM

## 2022-03-10 RX ORDER — MAGNESIUM HYDROXIDE/ALUMINUM HYDROXICE/SIMETHICONE 120; 1200; 1200 MG/30ML; MG/30ML; MG/30ML
30 SUSPENSION ORAL EVERY 4 HOURS PRN
Status: DISCONTINUED | OUTPATIENT
Start: 2022-03-10 | End: 2022-03-17 | Stop reason: HOSPADM

## 2022-03-10 RX ORDER — OLANZAPINE 10 MG/2ML
10 INJECTION, POWDER, FOR SOLUTION INTRAMUSCULAR 3 TIMES DAILY PRN
Status: DISCONTINUED | OUTPATIENT
Start: 2022-03-10 | End: 2022-03-17 | Stop reason: HOSPADM

## 2022-03-10 RX ADMIN — NITROFURANTOIN MONOHYDRATE/MACROCRYSTALLINE 100 MG: 25; 75 CAPSULE ORAL at 11:35

## 2022-03-10 RX ADMIN — ARIPIPRAZOLE 10 MG: 10 TABLET ORAL at 11:35

## 2022-03-10 ASSESSMENT — ACTIVITIES OF DAILY LIVING (ADL)
HYGIENE/GROOMING: SHOWER;INDEPENDENT
DRESS: SCRUBS (BEHAVIORAL HEALTH);INDEPENDENT

## 2022-03-10 NOTE — PLAN OF CARE
Problem: Behavioral Health Plan of Care  Goal: Plan of Care Review  Recent Flowsheet Documentation  Taken 3/10/2022 1135 by Lorin Biggs RN  Plan of Care Reviewed With: patient  Patient Agreement with Plan of Care: agrees  Goal: Adheres to Safety Considerations for Self and Others  Intervention: Develop and Maintain Individualized Safety Plan  Recent Flowsheet Documentation  Taken 3/10/2022 1135 by Lorin Biggs RN  Safety Measures:    environmental rounds completed    safety rounds completed  Goal: Absence of New-Onset Illness or Injury  Intervention: Identify and Manage Fall Risk  Recent Flowsheet Documentation  Taken 3/10/2022 1135 by Lorin Biggs RN  Safety Measures:    environmental rounds completed    safety rounds completed  Goal: Optimal Comfort and Wellbeing  Intervention: Provide Person-Centered Care  Recent Flowsheet Documentation  Taken 3/10/2022 1135 by Lorin Bgigs RN  Trust Relationship/Rapport:    care explained    questions encouraged  Goal: Develops/Participates in Therapeutic [place] to Support Successful Transition  Intervention: Foster Therapeutic [place]  Recent Flowsheet Documentation  Taken 3/10/2022 1135 by Lorin Biggs RN  Trust Relationship/Rapport:    care explained    questions encouraged   Goal Outcome Evaluation:  Patient spent majority of the shift isolative to her room, only coming out for meals.  She was frequently observed resting in bed during the 15 minute checks.  Patient irritable during medication administration and assessment.  Patient complained that all the questions were making her confused.   She at first, declined her antibiotic until its usage was explained to her in more detail.    Plan of Care Reviewed With: patient
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
## 2022-03-10 NOTE — PROGRESS NOTES
Pt is admitted in transfer from [facility]. Denies all psych symptoms. Pt refuses skin assessment. Refuses to sign any document.

## 2022-03-10 NOTE — PLAN OF CARE
"    Initial Psychosocial Assessment    Type of CM visit: Initial Assessment, Clinical Treatment Coordinator Role Introduction, Offer Discharge Planning    Information obtained from: [x]Patient   [x]Chart review  [x]Collateral Contacts  []Court Website    Hospitalization information:   Rubi Sepulveda is a 42 year old who was admitted to [address] [facility] on 3/9/2022 due to psychosis and agitation. She also was noted to have recent methamphetamine use.    Patient Self-Assessment  Patient reported reason for admission: \"I need to rest and recover. I am confused\"     Patient reported symptoms of concern: []sadness    []anxiety     []anger    []poor sleep     []medications not working    []racing thoughts     []substance use     []agitation     []hearing voices     []hopelessness   []Eating concerns    []Self-injury      [] Other   Comments:    Current suicidal ideation:  []No    []Yes, no plan     []Yes, with plan (describe): Pt would not engage in assessment          Comments:   Current homicidal ideation:  []No   [] Yes     Pt would not engage in assessment  Comments:     Legal Status at Admission: Voluntary/Patient has signed consent for treatment      History of Mental Health:  Describe current and past mental health symptoms present?  Per chart patient was brought into [facility] ED by [facility] for \"erratic behavior\". Per chart review and ACT team collateral, pt has a history of Schizoaffective DO and multiple hospitalizations for MI. Patient reports feeling confused and states she came to the hospital because she needs to \"rest and recover\". Writer initially briefly met with patient in the morning to obtain MARIA L for her ACT team and went back to complete assessment with patient in the afternoon. When asked why she was here patient stated \"I can't remember\". Writer asked patient about substance use and where she had been living she states she \"can't remember\". Patient quickly became agitated and jumped up stating writer " "did not understand her and needed to read that she was upset. Writer told patient writer could end the meeting and patient continued to pace in her room and yell. Writer was unable to collect additional information for this assessment and so obtained information through chart review and collateral by speaking with patient's ACT . Patient's ACT  Olinda reports historically patient had been able to function independently and was employed. Olinda reports patient started using more and stopped taking her medications around the time her mother gained custody of her children. Olinda reports patient has historically sought benzodiazepines and her ACT psychiatrists will no longer see her. Olinda states patient has attended crisis and IRTS placements but ends up leaving before completing the programs. She does not believe that patient has been to CD treatment in the past but feels this could be a good option for patient. Writer will attempt to re approach patient regarding treatment options.     Do you understand your mental health diagnosis? YES []   NO []Pt did not engage in assessment    History of psychiatric hospitalizations?  YES [x]     NO  []  Details: Hx of multiple hospitalizations per ACT team   If YES, within the last 30 days? YES []     NO  []    History of commitment?  YES [x]     NO  []    Details:   History of ECT?  YES []     NO  []    Details:     History of Substance Use Disorder:  Have you used alcohol or substances in the past 12 months? YES [x]/ NO [] Per chart review pt has been using methamphetamine. When asked about substance use patient states \"I can't remember\".               If Yes, Type Methamphetamine Frequency Unknown    Would you like a substance use disorder evaluation? YES [] / NO []Pt not engaged in assessment    Previous Treatment? YES []/ NO [x] Pt ACT CM reports she does not believe pt has ever engaged in CD tretament      Significant Life Events  (Illness, " Death, Loss): Pt  reports patient has a history of meeting people online, going to meet them out of state and then ending up in the hospital out of state.       Is there a history of abuse or psychological trauma:    []Denies       []Yes, present (type):         []Yes, past (type):        []Patient declined to answer    Identify current stressors:    []financial,    []legal issues,    [x]homelessness,    [x]housing,     []recent loss,    []relationships,    [x]substance use concerns,    []medical     []unemployment     []employment  concerns    []isolation,    []lack of resources,     []out of home placements,     []parenting issues     []domestic violence     []other:  Comments:       Living Situation:     []House/apt    []Group Home    []IRTS     [x]Homeless     []Assisted Living     []Nursing home    []Lives alone    []Lives with :                         []Other:           Family Composition: Single    Children, ages and current location if minor: 3 children who reside with patients mother. Pt mother has custody.   Relationship status  [x]Single     []     []     []       []Significant Other   []Other:     Educational Background:  []Less Than High School     []High School     []GED     [x]College (some college)       Cognitive/learning concerns: None reported.     Financial Status: [] Employed, status and location:  []Unemployment    []County Assistance     [x]SSI/SSDI      []Waivered services    []Other:    Legal status(present):   [x]Voluntary, []72-hour hold, []Commitment, []Guardianship, []Revocation, []Stay of commitment,    Details:    Other legal issues identified:  []None, []Arrest,  []Probation/[place],  []Driving under influence,  []Incarceration,  []Sexual offense (level):   []Child Protective Services,      [x]Other:None reported        Ethnic/Cultural considerations:  Pt would not engage in this portion of assessment    Spiritual considerations: Pt would not engage  "in this portion of the assessment.     Service History:  [x]No     []Yes: details:    Social Functioning (organization, interests) and strengths: Pt appears to be resilient as she has been able to function independently in the community while homeless. Pt has 3 children who are in the custody of her mother.     Current Treatment Providers Are:     NO Name, Agency, and phone   Psychiatrist  [] [facility]- ACT CM reports ACT psychiatrists will not meet with pt.    Psychotherapist  [x]    [facility] worker  [x]      [] Olinda Rodríguez: [phone], LUIS RN: Sandra: [phone]   Waivered Services  [x]    ACT Teams  [] [facility] [place]- Trying to switch pt to [facility]   Day Treatment/PHP/[facility] trtmt  [x]    Group Home/AFC/AL  [x]      [x]    Other:  []             Social Service Assessment of identified patient needs and plan to meet those needs: CTC will coordinate with patient's ACT team. Pt declined to sign any other releases at this time. Patient's ACT  reports patient has been homeless for just under a year after being evicted from her apartment and is not able to stay in the [facility] due to \"not following the rules\". She reports patient generally stays in the shelter, meets people and goes to stay with them. Patient indicates that she is interested in \"transitional housing\" and so writer will attempt to engage in discussion about residential MI/CD treatment versus IRTS placement. Patient's ACT CM states they have been trying to switch patient to a [place] ACT team as pt has been staying in [place]. They will send over ROIs to see if pt will be willing to sign. CTC will work on building rapport with patient in order to engage in discharge discussions. Patient ACT CM reports finding an appropriate discharge placement may be difficult as patient struggles with any \"rules\".       Possible discharge plan: IRTS v MI/CD Treatment " v Shelter        Barriers: Medication Management, Symptom Stabilization, Coordination of Care
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
## 2022-03-10 NOTE — PLAN OF CARE
Problem: Behavioral Health Plan of Care  Goal: Adheres to Safety Considerations for Self and Others  Intervention: Develop and Maintain Individualized Safety Plan  Recent Flowsheet Documentation  Taken 3/10/2022 0216 by Nancy Watkins, RN  Safety Measures:   environmental rounds completed   safety rounds completed     Problem: Sleep Disturbance  Goal: Adequate Sleep/Rest  Outcome: Ongoing, Progressing   Goal Outcome Evaluation:          Patient was observed sleeping through the night, without any acute distress. Safety checks completed Q 15 min's per protocol. No concerns noted during this shift. She had 7 hrs of sleep.

## 2022-03-10 NOTE — PHARMACY-ADMISSION MEDICATION HISTORY
Pharmacy Note - Admission Medication History    Pertinent Provider Information:   -- Abilify 10 mg daily last filled 12/15/21 x 10 days supply. Patient does report taking Abilify but she is confused and unsure when she last took it.  -- clonazepam 0.5 mg BID removed from PTA med list. It was originally prescribed in 2014 and there have been no pharmacy refill history for it in the last 12 months.   -- Tivicay and Truvada are historical medications from 2017. No recent refills in the last 12 months.   -- quetiapine was originally prescribed in 2014 but there are no recent refills in the last 12 months.   -- Lexapro 10 mg daily was last filled on 6/22/2021 x 30 day supply.   -- MacroBID started 3/9 (received 2 doses in the ED) and continued here for UTI.     ______________________________________________________________________    Prior To Admission (PTA) med list completed and updated in EMR.       PTA Med List   Medication Sig Note Last Dose     ARIPiprazole (ABILIFY) 10 MG tablet Take 1 tablet (10 mg) by mouth daily 3/10/2022: Last filled on 12/15/21 x 10 day supply. Patient reports taking Abilify but not sure when she last took it. 3/8/2022 at 2042     nitroFURantoin macrocrystal (MACRODANTIN) 100 MG capsule Take 100 mg by mouth every 12 hours 3/9/2022: New abx for UTI 3/9/2022 at x 2 doses last @ 1900       Information source(s): Clinic records, Hospital records and [facility]  Method of interview communication: N/A    Summary of Changes to PTA Med List  New: Macrobid for UTI started 3/9/22  Discontinued: clonazepam, quetiapine  Changed: none    The information provided in this note is only as accurate as the sources available at the time of the update(s).    Thank you for the opportunity to participate in the care of this patient.    [facility], located [facility]  3/10/2022 10:16 AM

## 2022-03-11 LAB
BASOPHILS # BLD AUTO: 0.1 10E3/UL (ref 0–0.2)
BASOPHILS NFR BLD AUTO: 1 %
EOSINOPHIL # BLD AUTO: 0.2 10E3/UL (ref 0–0.7)
EOSINOPHIL NFR BLD AUTO: 2 %
IMM GRANULOCYTES # BLD: 0 10E3/UL
IMM GRANULOCYTES NFR BLD: 0 %
LYMPHOCYTES # BLD AUTO: 2.6 10E3/UL (ref 0.8–5.3)
LYMPHOCYTES NFR BLD AUTO: 24 %
MONOCYTES # BLD AUTO: 1 10E3/UL (ref 0–1.3)
MONOCYTES NFR BLD AUTO: 9 %
NEUTROPHILS # BLD AUTO: 7 10E3/UL (ref 1.6–8.3)
NEUTROPHILS NFR BLD AUTO: 64 %
NRBC # BLD AUTO: 0 10E3/UL
NRBC BLD AUTO-RTO: 0 /100
WBC # BLD AUTO: 10.9 10E3/UL (ref 4–11)

## 2022-03-11 PROCEDURE — 99231 SBSQ HOSP IP/OBS SF/LOW 25: CPT

## 2022-03-11 PROCEDURE — 250N000013 HC RX MED GY IP 250 OP 250 PS 637: Performed by: PSYCHIATRY & NEUROLOGY

## 2022-03-11 PROCEDURE — 85048 AUTOMATED LEUKOCYTE COUNT: CPT

## 2022-03-11 PROCEDURE — 128N000001 HC R&B CD/MH ADULT

## 2022-03-11 PROCEDURE — 36415 COLL VENOUS BLD VENIPUNCTURE: CPT

## 2022-03-11 PROCEDURE — 99231 SBSQ HOSP IP/OBS SF/LOW 25: CPT | Performed by: PSYCHIATRY & NEUROLOGY

## 2022-03-11 RX ADMIN — ARIPIPRAZOLE 15 MG: 5 TABLET ORAL at 11:59

## 2022-03-11 ASSESSMENT — ACTIVITIES OF DAILY LIVING (ADL)
DRESS: SCRUBS (BEHAVIORAL HEALTH);INDEPENDENT
HYGIENE/GROOMING: HANDWASHING;SHOWER;INDEPENDENT
ORAL_HYGIENE: INDEPENDENT
ORAL_HYGIENE: INDEPENDENT;PROMPTS
HYGIENE/GROOMING: HANDWASHING;INDEPENDENT
DRESS: STREET CLOTHES;INDEPENDENT

## 2022-03-11 NOTE — PROGRESS NOTES
Pt denies pain. She stays mostly in her room in bed  this evening but she comes for meals. Pt denies all psych symptoms. She denies pain. She has bathroom privilege. She ambulates independently and appears to be steady on her feet. There has been no behavior problem.

## 2022-03-11 NOTE — PLAN OF CARE
Problem: Behavioral Health Plan of Care  Goal: Adheres to Safety Considerations for Self and Others  Outcome: Ongoing, Progressing  Intervention: Develop and Maintain Individualized Safety Plan  Recent Flowsheet Documentation  Taken 3/11/2022 0013 by Matthew Sibley, RN  Safety Measures: safety rounds completed  Goal: Absence of New-Onset Illness or Injury  Outcome: Ongoing, Progressing  Intervention: Identify and Manage Fall Risk  Recent Flowsheet Documentation  Taken 3/11/2022 0013 by Matthew Sibley, RN  Safety Measures: safety rounds completed     Problem: Sleep Disturbance  Goal: Adequate Sleep/Rest  Outcome: Ongoing, Progressing   Goal Outcome Evaluation:      Pt slept for more than seven hours and no behavior observed or reported and safety checks completed per protocol.
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
## 2022-03-11 NOTE — PLAN OF CARE
"  Problem: Activity and Energy Impairment (Anxiety Signs/Symptoms)  Goal: Optimized Energy Level (Anxiety Signs/Symptoms)  Outcome: Ongoing, Progressing  Intervention: Optimize Energy Level  Recent Flowsheet Documentation  Taken 3/11/2022 1100 by Colleen Shah RN  Patient Performed Hygiene:    undressed    dressed  Activity (Behavioral Health):    activity adjusted per tolerance    activity encouraged    up ad emilio    up in chair     Problem: Cognitive Impairment (Anxiety Signs/Symptoms)  Goal: Optimized Cognitive Function (Anxiety Signs/Symptoms)  Outcome: Ongoing, Progressing     Problem: Behavioral Health Plan of Care  Goal: Plan of Care Review  Outcome: Ongoing, Progressing  Flowsheets (Taken 3/11/2022 1100)  Plan of Care Reviewed With: patient  Patient Agreement with Plan of Care: (\"you guys don't understand\") agrees with comment (describe)  Goal: Patient-Specific Goal (Individualization)  Outcome: Ongoing, Progressing  Goal: Adheres to Safety Considerations for Self and Others  Outcome: Ongoing, Progressing  Intervention: Develop and Maintain Individualized Safety Plan  Recent Flowsheet Documentation  Taken 3/11/2022 1100 by Colleen Shah RN  Safety Measures:    environmental rounds completed    safety rounds completed  Goal: Absence of New-Onset Illness or Injury  Outcome: Ongoing, Progressing  Intervention: Identify and Manage Fall Risk  Recent Flowsheet Documentation  Taken 3/11/2022 1100 by Colleen Shah RN  Safety Measures:    environmental rounds completed    safety rounds completed  Goal: Optimal Comfort and Wellbeing  Outcome: Ongoing, Progressing  Intervention: Provide Person-Centered Care  Recent Flowsheet Documentation  Taken 3/11/2022 1100 by Colleen Shah RN  Trust Relationship/Rapport:    care explained    empathic listening provided    questions answered    thoughts/feelings acknowledged    reassurance provided  Goal: Optimized Coping Skills in Response to Life Stressors  Outcome: " "Ongoing, Progressing  Intervention: Promote Effective Coping Strategies  Recent Flowsheet Documentation  Taken 3/11/2022 1100 by Colleen Shah RN  Supportive Measures:    active listening utilized    verbalization of feelings encouraged    positive reinforcement provided  Goal: Develops/Participates in Therapeutic [place] to Support Successful Transition  Outcome: Ongoing, Progressing  Intervention: Foster Therapeutic [place]  Recent Flowsheet Documentation  Taken 3/11/2022 1100 by Colleen Shah RN  Trust Relationship/Rapport:    care explained    empathic listening provided    questions answered    thoughts/feelings acknowledged    reassurance provided  Goal: Team Discussion  Outcome: Ongoing, Progressing     Problem: Sleep Disturbance  Goal: Adequate Sleep/Rest  Outcome: Ongoing, Progressing  Intervention: Promote Sleep/Rest  Recent Flowsheet Documentation  Taken 3/11/2022 1100 by Colleen Shah RN  Sleep/Rest Enhancement: comfort measures   Goal Outcome Evaluation:    Plan of Care Reviewed With: patient        Patient partially calm but easily irritable. Patient not cooperative with assessments questions, repeatedly saying, \"I don't remember anything, you guys don't understand. I haven't been sleeping for years.\" Denies all psych symptoms. Patient stated too many questions confuses her. Patient isolative to her room majority of the shift, only out for meals. Does not engages with peers and staff. Did not attend groups. Patient med complaint. Contract for safety.            "

## 2022-03-12 PROCEDURE — 99231 SBSQ HOSP IP/OBS SF/LOW 25: CPT | Performed by: PSYCHIATRY & NEUROLOGY

## 2022-03-12 PROCEDURE — 250N000013 HC RX MED GY IP 250 OP 250 PS 637: Performed by: PSYCHIATRY & NEUROLOGY

## 2022-03-12 PROCEDURE — 128N000001 HC R&B CD/MH ADULT

## 2022-03-12 RX ADMIN — ARIPIPRAZOLE 15 MG: 5 TABLET ORAL at 08:07

## 2022-03-12 RX ADMIN — NICOTINE POLACRILEX 2 MG: 2 GUM, CHEWING BUCCAL at 17:26

## 2022-03-12 ASSESSMENT — ACTIVITIES OF DAILY LIVING (ADL)
DRESS: INDEPENDENT
DRESS: INDEPENDENT
HYGIENE/GROOMING: HANDWASHING;INDEPENDENT
ORAL_HYGIENE: INDEPENDENT
ORAL_HYGIENE: INDEPENDENT
HYGIENE/GROOMING: HANDWASHING;INDEPENDENT
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
## 2022-03-12 NOTE — PLAN OF CARE
Alert and oriented, able to communicate needs. Flat, blunted, in room all shift except for for meals, stated she wanted to rest more. Not engaged with peers, able to participate in assessment with brief answers, did not display any irritability. Cooperative and calm, denied any anxiety or depression, denied hallucinations or delusions, no SI/HI, contracted for safety. She denied pain, appeared in no distress.   Problem: Behavioral Health Plan of Care  Goal: Patient-Specific Goal (Individualization)  Outcome: Ongoing, Not Progressing  Goal: Optimized Coping Skills in Response to Life Stressors  Outcome: Ongoing, Not Progressing  Flowsheets (Taken 3/11/2022 2112)  Optimized Coping Skills in Response to Life Stressors: unable to achieve outcome     Problem: Behavioral Health Plan of Care  Goal: Absence of New-Onset Illness or Injury  Outcome: Ongoing, Progressing  Intervention: Identify and Manage Fall Risk  Recent Flowsheet Documentation  Taken 3/11/2022 1621 by Ramsey Lott RN  Safety Measures:   environmental rounds completed   safety rounds completed  Goal: Optimal Comfort and Wellbeing  Outcome: Ongoing, Progressing     Problem: Activity and Energy Impairment (Anxiety Signs/Symptoms)  Goal: Optimized Energy Level (Anxiety Signs/Symptoms)  Intervention: Optimize Energy Level  Recent Flowsheet Documentation  Taken 3/11/2022 1621 by Ramsey Lott RN  Activity (Behavioral Health): activity encouraged     Problem: Behavioral Health Plan of Care  Goal: Plan of Care Review  Recent Flowsheet Documentation  Taken 3/11/2022 1621 by Ramsey Lott RN  Plan of Care Reviewed With: patient  Patient Agreement with Plan of Care: agrees  Goal: Adheres to Safety Considerations for Self and Others  Intervention: Develop and Maintain Individualized Safety Plan  Recent Flowsheet Documentation  Taken 3/11/2022 1621 by Ramsey Lott, RN  Safety Measures:   environmental rounds completed   safety rounds completed    Goal Outcome Evaluation:    Plan of Care Reviewed With: patient

## 2022-03-12 NOTE — PLAN OF CARE
Goal Outcome Evaluation:        Patient was calm and slept most part of the shift, safety checks were conducted every 15 minutes to ensure patient's safety. No pain was reported and patient denied anxiety, depression and hallucination. No medication was due at this time.

## 2022-03-12 NOTE — PLAN OF CARE
"Alert, flat, blunted, irritable. Out for meals only, not engaged with peers, dismissive with assessment \"I would rather not socialize, I don't wanna be social\" refused to answer assessment questions. Speech sounded incoherent. Resting in bed all shift, no apparent distress noted, no pain, anxiety or depression reported. No SIB.  Problem: Behavioral Health Plan of Care  Goal: Optimized Coping Skills in Response to Life Stressors  Outcome: Ongoing, Not Progressing  Flowsheets (Taken 3/12/2022 1639)  Optimized Coping Skills in Response to Life Stressors: unable to achieve outcome  Goal: Develops/Participates in Therapeutic [place] to Support Successful Transition  Outcome: Ongoing, Not Progressing  Flowsheets (Taken 3/12/2022 1639)  Develops/Participates in Therapeutic [place] to Support Successful Transition: unable to achieve outcome     Problem: Behavioral Health Plan of Care  Goal: Absence of New-Onset Illness or Injury  Outcome: Ongoing, Progressing  Goal: Optimal Comfort and Wellbeing  Outcome: Ongoing, Progressing     Problem: Activity and Energy Impairment (Anxiety Signs/Symptoms)  Goal: Optimized Energy Level (Anxiety Signs/Symptoms)  Intervention: Optimize Energy Level  Recent Flowsheet Documentation  Taken 3/12/2022 1621 by Ramsey Lott, RN  Activity (Behavioral Health):    activity encouraged    up ad emilio     Problem: Behavioral Health Plan of Care  Goal: Plan of Care Review  Recent Flowsheet Documentation  Taken 3/12/2022 1621 by Ramsey Lott, RN  Plan of Care Reviewed With: patient  Patient Agreement with Plan of Care: refuses to participate   Goal Outcome Evaluation:    Plan of Care Reviewed With: patient                 "

## 2022-03-12 NOTE — PLAN OF CARE
Patient denies SI/HI and is able to contract for safety. Remains med compliant and denies depression and anxiety, also denies having auditory and or visual hallucinations, patient gait remains steady.  Patient needs continually encouragement to attend groups and interact with peers and participate in her plan of care. Her responses are vague and abrupt but ultimately answers.  Patient  continues to be discouraged from sleeping all day and isolating to room. Patient's intake is adequate at this time and we will continue to encourage positive interactions and participation in mental health well being    Problem: Behavioral Health Plan of Care  Goal: Absence of New-Onset Illness or Injury  Intervention: Identify and Manage Fall Risk  Recent Flowsheet Documentation  Taken 3/12/2022 1100 by Eulalio Jones RN  Safety Measures: environmental rounds completed     Problem: Sleep Disturbance  Goal: Adequate Sleep/Rest  Outcome: Met  Intervention: Promote Sleep/Rest  Recent Flowsheet Documentation  Taken 3/12/2022 1100 by Eulalio Jones, RN  Sleep/Rest Enhancement: comfort measures     Problem: Suicidal Behavior  Goal: Suicidal Behavior is Absent or Managed  3/12/2022 1115 by Eulalio Jones, RN  Outcome: Met  3/12/2022 1114 by Eulalio Jones, RN  Outcome: Met   Goal Outcome Evaluation:    Plan of Care Reviewed With: patient     Overall Patient Progress: improving            No

## 2022-03-13 PROCEDURE — 128N000001 HC R&B CD/MH ADULT

## 2022-03-13 PROCEDURE — 250N000013 HC RX MED GY IP 250 OP 250 PS 637: Performed by: PSYCHIATRY & NEUROLOGY

## 2022-03-13 RX ADMIN — NICOTINE POLACRILEX 2 MG: 2 GUM, CHEWING BUCCAL at 16:27

## 2022-03-13 RX ADMIN — ARIPIPRAZOLE 15 MG: 5 TABLET ORAL at 08:02

## 2022-03-13 ASSESSMENT — ACTIVITIES OF DAILY LIVING (ADL)
ORAL_HYGIENE: INDEPENDENT
HYGIENE/GROOMING: INDEPENDENT
DRESS: SCRUBS (BEHAVIORAL HEALTH)
DRESS: SCRUBS (BEHAVIORAL HEALTH)
ORAL_HYGIENE: INDEPENDENT
HYGIENE/GROOMING: HANDWASHING;INDEPENDENT

## 2022-03-13 NOTE — PLAN OF CARE
Patient denies SI/HI and contracts for safety, No S/S of auditory or visual hallucinations. Patient continues to deny depression and or anxiety and demeanor appears more relaxed than in previous days. Independent with all cares and completes in timely manner, intake is adequate. Continue to encourage interactions with peers and or staff and decrease time spent lying in bed.   Problem: Behavioral Health Plan of Care  Goal: Plan of Care Review  Outcome: Ongoing, Progressing  Flowsheets  Taken 3/13/2022 1106  Plan of Care Reviewed With: patient  Overall Patient Progress: improving  Patient Agreement with Plan of Care: agrees  Taken 3/13/2022 1000  Plan of Care Reviewed With: patient  Patient Agreement with Plan of Care: agrees  Note: Patient exhibits a more relaxed demeanor and continues to be soft spoken but willing to be engaged in assessment questions   Goal: Adheres to Safety Considerations for Self and Others  Intervention: Develop and Maintain Individualized Safety Plan  Recent Flowsheet Documentation  Taken 3/13/2022 1000 by Eulalio Jones RN  Safety Measures: environmental rounds completed  Goal: Absence of New-Onset Illness or Injury  Intervention: Identify and Manage Fall Risk  Recent Flowsheet Documentation  Taken 3/13/2022 1000 by Eulalio Jones RN  Safety Measures: environmental rounds completed  Goal: Optimal Comfort and Wellbeing  Intervention: Provide Person-Centered Care  Recent Flowsheet Documentation  Taken 3/13/2022 1000 by Eulalio Jones RN  Trust Relationship/Rapport:   care explained   choices provided   emotional support provided   empathic listening provided   questions answered   questions encouraged  Goal: Develops/Participates in Therapeutic [place] to Support Successful Transition  Intervention: Foster Therapeutic [place]  Recent Flowsheet Documentation  Taken 3/13/2022 1000 by Eulalio Jones RN  Trust Relationship/Rapport:   care explained   choices provided    emotional support provided   empathic listening provided   questions answered   questions encouraged   Goal Outcome Evaluation:    Plan of Care Reviewed With: patient     Overall Patient Progress: improving

## 2022-03-13 NOTE — PLAN OF CARE
Problem: Behavioral Health Plan of Care  Goal: Adheres to Safety Considerations for Self and Others  Intervention: Develop and Maintain Individualized Safety Plan  Recent Flowsheet Documentation  Taken 3/13/2022 0119 by Nancy Watkins, RN  Safety Measures:   environmental rounds completed   safety rounds completed     Problem: Sleep Disturbance  Goal: Adequate Sleep/Rest  Outcome: Ongoing, Progressing  Intervention: Promote Sleep/Rest  Recent Flowsheet Documentation  Taken 3/13/2022 0119 by Nancy Watkins, RN  Sleep/Rest Enhancement:   comfort measures   noise level reduced   Goal Outcome Evaluation:          Patient slept for most part of the night. Safety checks completed per unit policy. No psych symptoms observed. She had 7 hrs of sleep.

## 2022-03-13 NOTE — PLAN OF CARE
Alert and oriented x3, able to communicate needs. More visible on unit, interactive and polite with staff members, bright, appeared pleasant. Back 4/10, managed with rest, hot pack. Denied anxiety or depression, no SI/HI, contracted for safety. Appeared not responding to internal stimuli.   Problem: Behavioral Health Plan of Care  Goal: Adheres to Safety Considerations for Self and Others  Outcome: Ongoing, Progressing  Flowsheets (Taken 3/13/2022 1649)  Adheres to Safety Considerations for Self and Others: making progress toward outcome  Intervention: Develop and Maintain Individualized Safety Plan  Recent Flowsheet Documentation  Taken 3/13/2022 1637 by Ramsey Lott, RN  Safety Measures:    environmental rounds completed    safety rounds completed  Goal: Absence of New-Onset Illness or Injury  Outcome: Ongoing, Progressing  Intervention: Identify and Manage Fall Risk  Recent Flowsheet Documentation  Taken 3/13/2022 1637 by Ramsey Lott, RN  Safety Measures:    environmental rounds completed    safety rounds completed  Goal: Optimal Comfort and Wellbeing  Outcome: Ongoing, Progressing  Intervention: Monitor Pain and Promote Comfort  Recent Flowsheet Documentation  Taken 3/13/2022 1637 by Ramsey Lott, RN  Pain Management Interventions: heat applied  Goal: Optimized Coping Skills in Response to Life Stressors  Outcome: Ongoing, Progressing  Flowsheets (Taken 3/13/2022 1649)  Optimized Coping Skills in Response to Life Stressors: making progress toward outcome     Problem: Activity and Energy Impairment (Anxiety Signs/Symptoms)  Goal: Optimized Energy Level (Anxiety Signs/Symptoms)  Intervention: Optimize Energy Level  Recent Flowsheet Documentation  Taken 3/13/2022 1637 by Ramsey Lott, RN  Patient Performed Hygiene: teeth brushed  Activity (Behavioral Health): activity encouraged     Problem: Behavioral Health Plan of Care  Goal: Plan of Care Review  Recent Flowsheet Documentation  Taken  3/13/2022 1637 by Ramsey Lott, RN  Plan of Care Reviewed With: patient  Patient Agreement with Plan of Care: agrees   Goal Outcome Evaluation:    Plan of Care Reviewed With: patient

## 2022-03-14 PROCEDURE — 99231 SBSQ HOSP IP/OBS SF/LOW 25: CPT | Performed by: PSYCHIATRY & NEUROLOGY

## 2022-03-14 PROCEDURE — 250N000013 HC RX MED GY IP 250 OP 250 PS 637: Performed by: PSYCHIATRY & NEUROLOGY

## 2022-03-14 PROCEDURE — 128N000001 HC R&B CD/MH ADULT

## 2022-03-14 RX ADMIN — HYDROXYZINE HYDROCHLORIDE 25 MG: 25 TABLET, FILM COATED ORAL at 20:00

## 2022-03-14 RX ADMIN — NICOTINE POLACRILEX 2 MG: 2 GUM, CHEWING BUCCAL at 20:00

## 2022-03-14 RX ADMIN — ARIPIPRAZOLE 15 MG: 5 TABLET ORAL at 08:05

## 2022-03-14 RX ADMIN — NICOTINE POLACRILEX 2 MG: 2 GUM, CHEWING BUCCAL at 15:58

## 2022-03-14 RX ADMIN — ACETAMINOPHEN 650 MG: 325 TABLET ORAL at 20:00

## 2022-03-14 RX ADMIN — NICOTINE POLACRILEX 2 MG: 2 GUM, CHEWING BUCCAL at 18:33

## 2022-03-14 RX ADMIN — TRAZODONE HYDROCHLORIDE 50 MG: 50 TABLET ORAL at 20:00

## 2022-03-14 ASSESSMENT — ACTIVITIES OF DAILY LIVING (ADL)
LAUNDRY: UNABLE TO COMPLETE
DRESS: STREET CLOTHES
HYGIENE/GROOMING: INDEPENDENT
ORAL_HYGIENE: INDEPENDENT
ORAL_HYGIENE: INDEPENDENT
HYGIENE/GROOMING: INDEPENDENT
DRESS: INDEPENDENT

## 2022-03-14 NOTE — PLAN OF CARE
Goal Outcome Evaluation:      Patient appeared to be sleeping soundly without any distress. Safety checks were completed every 15 minutes, no pain was reported and no PRN was given. Patient denied anxiety, depression, and hallucination. No scheduled medication was due at this time. We will continue to observe and report any changes.
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
## 2022-03-14 NOTE — CARE PLAN
03/14/22 1016   Team Discussion   Participants Psychiatrist not presents, RN- RH; SW-RD; PharmD- [facility]   Progress Improving   Anticipated length of stay TBD   Continued Stay Criteria/Rationale further symptom stabilizationcare-coordination, medication-management   Medical/Physical UTI   Plan IRTS vs Shelter

## 2022-03-14 NOTE — PLAN OF CARE
Patient continues to deny SI/HI and contracts for safety, denies auditory and visual hallucinations and admits to anxiety 3/10 d/t making decision to go to an IRTS program. She denies depression and has made attempts to interact with peers and staff vs isolating to self and room. Patient remains med compliant, independent with daily adls' and exhibits adequate intake at all meals  Problem: Behavioral Health Plan of Care  Goal: Plan of Care Review  Outcome: Ongoing, Progressing  Flowsheets  Taken 3/14/2022 1358  Plan of Care Reviewed With: patient  Overall Patient Progress: improving  Patient Agreement with Plan of Care: agrees  Taken 3/14/2022 1100  Plan of Care Reviewed With: patient  Patient Agreement with Plan of Care: agrees   Goal Outcome Evaluation:    Plan of Care Reviewed With: patient     Overall Patient Progress: improving
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
## 2022-03-14 NOTE — PROVIDER NOTIFICATION
03/14/22 1016   Individualization/Patient Specific Goals   Patient Personal Strengths resilient   Patient Vulnerabilities substance abuse/addiction;housing insecurity;history of unsuccessful treatment;poor impulse control   Anxieties, Fears or Concerns Pt reports that she can not recall events prior to hosptialization   Patient-Specific Goals (Include Timeframe) Transitional housing   Team Discussion   Participants Psychiatrist not presents, RN- RH; MARIERD; PharmD- [facility]   Progress Improving   Anticipated length of stay TBD   Continued Stay Criteria/Rationale further symptom stabilizationcare-coordination, medication-management   Medical/Physical UTI   Plan IRTS vs Shelter   Anticipated Discharge Disposition IRTS

## 2022-03-15 PROCEDURE — 128N000001 HC R&B CD/MH ADULT

## 2022-03-15 PROCEDURE — 99231 SBSQ HOSP IP/OBS SF/LOW 25: CPT | Performed by: PSYCHIATRY & NEUROLOGY

## 2022-03-15 PROCEDURE — 250N000013 HC RX MED GY IP 250 OP 250 PS 637: Performed by: PSYCHIATRY & NEUROLOGY

## 2022-03-15 RX ADMIN — NICOTINE POLACRILEX 2 MG: 2 GUM, CHEWING BUCCAL at 11:16

## 2022-03-15 RX ADMIN — NICOTINE POLACRILEX 2 MG: 2 GUM, CHEWING BUCCAL at 18:54

## 2022-03-15 RX ADMIN — NICOTINE POLACRILEX 2 MG: 2 GUM, CHEWING BUCCAL at 08:05

## 2022-03-15 RX ADMIN — ARIPIPRAZOLE 15 MG: 5 TABLET ORAL at 08:05

## 2022-03-15 ASSESSMENT — ACTIVITIES OF DAILY LIVING (ADL)
ORAL_HYGIENE: INDEPENDENT
ORAL_HYGIENE: INDEPENDENT
LAUNDRY: WITH SUPERVISION
DRESS: INDEPENDENT
HYGIENE/GROOMING: INDEPENDENT
LAUNDRY: WITH SUPERVISION
HYGIENE/GROOMING: INDEPENDENT
DRESS: INDEPENDENT

## 2022-03-15 NOTE — PLAN OF CARE
Problem: Behavioral Health Plan of Care  Goal: Plan of Care Review  Recent Flowsheet Documentation  Taken 3/14/2022 1642 by Carri Sims, RN  Plan of Care Reviewed With: patient  Patient Agreement with Plan of Care: agrees  Goal: Adheres to Safety Considerations for Self and Others  Outcome: Met  Intervention: Develop and Maintain Individualized Safety Plan  Recent Flowsheet Documentation  Taken 3/14/2022 1642 by Carri Sims, RN  Safety Measures: safety rounds completed  Goal: Absence of New-Onset Illness or Injury  Intervention: Identify and Manage Fall Risk  Recent Flowsheet Documentation  Taken 3/14/2022 1642 by Carri Sims, RN  Safety Measures: safety rounds completed  Goal: Optimized Coping Skills in Response to Life Stressors  Outcome: Ongoing, Progressing   Goal Outcome Evaluation:    Plan of Care Reviewed With: patient         Patient wants to leave, but agreed to stay and talk with the Psychiatrist tomorrow. Pt. States she wants to go outside and have a cigarette, PRN nicotine gum x 3 helpful. Out in the milieu most of the shift watching movies, minimal engagement with peers. Rating 5/10 lower back and knee pain, PRN Tylenol effective. Endorses anxiety 5/10 due to a past trauma event, PRN Hydroxyzine and Trazodone effective, denies depression, SI/HI and hallucinations, contracts for safety, Intake is adequate, med compliant.  No transfer, due to unit #4500 having multiple admissions and high acuity, per supervisor, Dr. Richards aware.
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
## 2022-03-15 NOTE — PROGRESS NOTES
Pt was out in the lounge upon approach, pt declined group invite stated she just wanted to get a better feel for the unit first.       03/15/22 7751   Engagement   Intervention Group   Topic Detail Wall art hangings with education provided on task or project completion and creative hands on endeavor, to increase concentration, follow through, planning, problem solving, healthy leisure engagement, coping, creative expression, attention to detail, symptom management and socialization   Attendance Did not attend   Reason for Not Attending Refused

## 2022-03-15 NOTE — PLAN OF CARE
Problem: Cognitive Impairment (Anxiety Signs/Symptoms)  Goal: Optimized Cognitive Function (Anxiety Signs/Symptoms)  3/15/2022 1810 by Esther Lincoln RN  Outcome: Ongoing, Progressing  3/15/2022 0932 by Esther Lincoln RN  Outcome: Ongoing, Progressing   Goal Outcome Evaluation:    Plan of Care Reviewed With: patient        Patient isolative in her room. Came out for dinner and briefly stayed out. Minimally interactive with peers. Reported minimal anxiety and depression at 2. Patient denied SI/HI and hallucinations. She contracted for safety. No outburst noted.

## 2022-03-15 NOTE — PLAN OF CARE
Goal Outcome Evaluation:    Patient slept all night without any distress, safety checks were conducted every 15 minutes to ensure patient's safety. No pain was reported and no PRN was given. Patient denied anxiety, depression and hallucination. No scheduled medication was due at this time.

## 2022-03-15 NOTE — PLAN OF CARE
Problem: Activity and Energy Impairment (Anxiety Signs/Symptoms)  Goal: Optimized Energy Level (Anxiety Signs/Symptoms)  Outcome: Ongoing, Progressing     Problem: Suicidal Behavior  Goal: Suicidal Behavior is Absent or Managed  Outcome: Ongoing, Progressing   Goal Outcome Evaluation:    Patient transferred to 4500 at about 12:45, observed calm and pleasant. Vital signs stable. Denied pain and all psych symptoms, contracted for safety.  Patient oriented to the unit, no behaviors noted.  Will continue plan of care.

## 2022-03-15 NOTE — PLAN OF CARE
"Goal Outcome Evaluation:    Plan of Care Reviewed With: patient        Problem: Cognitive Impairment (Anxiety Signs/Symptoms)  Goal: Optimized Cognitive Function (Anxiety Signs/Symptoms)  Outcome: Ongoing, Progressing     Problem: Suicidal Behavior  Goal: Suicidal Behavior is Absent or Managed  Outcome: Ongoing, Progressing        Patient appeared calm and pleasant at the beginning of the shift. On assessment, patient denied any anxiety or depression. She denied SI/HI and hallucinations. Patient visible at breakfast and was compliant with her medications. Patient was seen by provider this morning. Patient claimed that she signed 12 hour intent which could not found in her chat neither any RN to RN report on patient signing such form. Patient was placed on 72 hour hold initiated at 0845. Patient was served but was adamant about the hold, stating that the provider has no right to place her on hold. Patient became angry and said she was going to andre the provider and include RN's name in it. \"I just want to go out and smoke and then come back and figure out why I'm here.\" RN tried to explain that smoking is not allowed here but patient was talking over RN and said \"I read law you know.\" Patient snatched hold papers from RN and started reading on her own. Patient mocked and laughed that the provider claimed to be a physician when he is not. Patient requested for patient advocated number which was provided for her. Later calmed down on her own. Patient has order to transfer to 4500. Will give report to 4500 RN.      "

## 2022-03-16 VITALS
RESPIRATION RATE: 16 BRPM | DIASTOLIC BLOOD PRESSURE: 80 MMHG | TEMPERATURE: 98.3 F | OXYGEN SATURATION: 99 % | WEIGHT: 118.1 LBS | BODY MASS INDEX: 20.27 KG/M2 | HEART RATE: 98 BPM | SYSTOLIC BLOOD PRESSURE: 114 MMHG

## 2022-03-16 PROCEDURE — 128N000001 HC R&B CD/MH ADULT

## 2022-03-16 PROCEDURE — 99231 SBSQ HOSP IP/OBS SF/LOW 25: CPT | Performed by: PSYCHIATRY & NEUROLOGY

## 2022-03-16 PROCEDURE — 250N000013 HC RX MED GY IP 250 OP 250 PS 637: Performed by: PSYCHIATRY & NEUROLOGY

## 2022-03-16 RX ORDER — POLYETHYLENE GLYCOL 3350 17 G
2 POWDER IN PACKET (EA) ORAL
Status: DISCONTINUED | OUTPATIENT
Start: 2022-03-16 | End: 2022-03-17 | Stop reason: HOSPADM

## 2022-03-16 RX ADMIN — NICOTINE POLACRILEX 2 MG: 2 LOZENGE ORAL at 15:48

## 2022-03-16 RX ADMIN — NICOTINE POLACRILEX 2 MG: 2 LOZENGE ORAL at 11:46

## 2022-03-16 RX ADMIN — NICOTINE POLACRILEX 2 MG: 2 LOZENGE ORAL at 14:29

## 2022-03-16 RX ADMIN — NICOTINE POLACRILEX 2 MG: 2 LOZENGE ORAL at 16:47

## 2022-03-16 RX ADMIN — ARIPIPRAZOLE 15 MG: 5 TABLET ORAL at 08:22

## 2022-03-16 ASSESSMENT — ACTIVITIES OF DAILY LIVING (ADL)
HYGIENE/GROOMING: INDEPENDENT
ORAL_HYGIENE: INDEPENDENT
HYGIENE/GROOMING: INDEPENDENT
LAUNDRY: WITH SUPERVISION
DRESS: STREET CLOTHES

## 2022-03-16 NOTE — PLAN OF CARE
"  Problem: Behavioral Health Plan of Care  Goal: Adheres to Safety Considerations for Self and Others  Outcome: Ongoing, Progressing  Intervention: Develop and Maintain Individualized Safety Plan  Recent Flowsheet Documentation  Taken 3/16/2022 0800 by Jamaica Hauser RN  Safety Measures: safety rounds completed     Problem: Activity and Energy Impairment (Anxiety Signs/Symptoms)  Goal: Optimized Energy Level (Anxiety Signs/Symptoms)  Outcome: Ongoing, Progressing  Intervention: Optimize Energy Level  Recent Flowsheet Documentation  Taken 3/16/2022 0800 by Jamaica Hauser RN  Diversional Activity: television  Activity (Behavioral Health): up ad emilio   Goal Outcome Evaluation:    Plan of Care Reviewed With: patient     Pt alert and oriented X3. Thought process was linear and organized. Speech is appropriate and clear. Patient's verbal reports match affect and behavior.    Pain 0/10, anxiety 0/10, Depression 0/10, Denies SI and HI, Denies A/VH. Contracts for safety.   Appetite is good eating all of meals. Bowels are moving normally. VSS.  Pt came to nurse stating she was on a Voluntary basis and wanted to leave to \"go take care of some things at the library\"  She is no longer 72 H hold. Is now voluntary . SW was called to talk to her about the status of her leaving.  Wants to change benefits from [facility] to [facility] or something like that.      Pt was observed sitting calmly in lounge, watching TV,  and in in own room  for the majority of the shift. Pt did participate in groups. Pt was med compliant. Pt sleeps well.    PRN  Nicotine lozenge was given. No other behaviors noted this shift.  Pt remains on no precautions for safety.      Uses no ambulatory aids.      Jamaica PARKER RN, CMSRN  [facility]                     "

## 2022-03-16 NOTE — PROGRESS NOTES
03/16/22 0915   Engagement   Intervention Group   Topic Detail OT: Education on physical and social wellness with physical movement (theraband) and interactive social activity (Chat Pack) to increase concentration, attention to task, symptom management, coping with stress, sharing information about self, listening to others, physical wellness, social wellness, and overall well-being   Attendance Did not attend

## 2022-03-16 NOTE — PROGRESS NOTES
Assumed care at 1930. Patient was in bed sleeping when writer approached patient. Writer asked if patient wants snacks but pt declined. She states she only wants to sleep.

## 2022-03-16 NOTE — PLAN OF CARE
Assessment/Intervention/Current Symtoms and Care Coordination    Writer met with patient and consulted with MD. Writer received call from patient's ACT CM Olinda and provided update. Writer informed her that MD is not going to petition for commitment as patient is in agreement with IRTS referrals and has been improving.   Writer received ROIs from [facility] ACT as patient is planning to switch ACT teams. Patient's [place] team has recommended that patient transfer ACT teams as she has been staying in [facility]. Patient is in agreement with this plan and signed ROIs. Writer sent back to MARY JANE Lamar ACT Supervisor.    Patient presents as pleasant and states she is hoping to discharge from the hospital so she can smoke. Writer explained that her ACT  is hoping she can connect with the [facility] team prior to discharge. Patient agreed to have writer connect with the [facility] team to discuss. Patient requests to discharge to a crisis bed. Writer reviewed [facility] as an option(pt previously declined MARIA L for [facility]). Pt agreed to sign MARIA L for crisis and IRTS and writer made IRTS referral.       Discharge Plan or Goal: IRTS v Crisis      Barriers to Discharge: Symptom stab, med management      Referral Status:   3/14:Re-Entry IRTS  3/15: [facility]  3/15: [place]      Legal Status: 72 hour hold      Shelby Nguyen, [facility], 3/16/2022,
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
## 2022-03-16 NOTE — PLAN OF CARE
Goal Outcome Evaluation:    Problem: Sleep Disturbance  Goal: Adequate Sleep/Rest  Outcome: Ongoing, Progressing   On  Q 15 minutes safety rounds, patient was observed sleeping. Eyes closed, respiratory rate normal. No complaints or concerns from patient at this time, no behaviors noted. Pt slept > 7 hours thus far, will continue to monitor.

## 2022-03-17 LAB — SARS-COV-2 RNA RESP QL NAA+PROBE: NEGATIVE

## 2022-03-17 PROCEDURE — 87635 SARS-COV-2 COVID-19 AMP PRB: CPT | Performed by: PSYCHIATRY & NEUROLOGY

## 2022-03-17 PROCEDURE — 250N000013 HC RX MED GY IP 250 OP 250 PS 637: Performed by: PSYCHIATRY & NEUROLOGY

## 2022-03-17 PROCEDURE — 99239 HOSP IP/OBS DSCHRG MGMT >30: CPT | Performed by: PSYCHIATRY & NEUROLOGY

## 2022-03-17 RX ORDER — ARIPIPRAZOLE 15 MG/1
15 TABLET ORAL DAILY
Qty: 30 TABLET | Refills: 0 | Status: SHIPPED | OUTPATIENT
Start: 2022-03-18

## 2022-03-17 RX ADMIN — NICOTINE POLACRILEX 2 MG: 2 LOZENGE ORAL at 09:47

## 2022-03-17 RX ADMIN — NICOTINE POLACRILEX 2 MG: 2 LOZENGE ORAL at 06:44

## 2022-03-17 RX ADMIN — NICOTINE POLACRILEX 2 MG: 2 LOZENGE ORAL at 09:33

## 2022-03-17 RX ADMIN — NICOTINE POLACRILEX 2 MG: 2 LOZENGE ORAL at 13:19

## 2022-03-17 RX ADMIN — ARIPIPRAZOLE 15 MG: 5 TABLET ORAL at 08:21

## 2022-03-17 RX ADMIN — NICOTINE POLACRILEX 2 MG: 2 LOZENGE ORAL at 11:12

## 2022-03-17 NOTE — PROGRESS NOTES
Patient denies any feelings of depression, anxiety and denies SI, HI and contracts for safety. I went over all discharge paperwork and answered all her questions. All her belongings were given back to her. I waked her down to the front entrance of the [facility] and a cab picked her up to go to [facility].
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
## 2022-03-17 NOTE — PLAN OF CARE
Problem: Behavioral Health Plan of Care  Goal: Adheres to Safety Considerations for Self and Others  Outcome: Ongoing, Progressing     Pt. Pleasant and cooperative. Pt. Isolative to room this evening but came out for snack. Pt. Denied all psych symptoms.

## 2022-03-17 NOTE — PLAN OF CARE
Discharge plan or goal: [facility]    Today's Plan: Writer consulted with MD and met with patient. Patient is still requesting crisis residence placement. Patient was in agreement with [facility] referral and so writer made crisis and IRTS referrals. Patient was accepted to [facility] for 2pm this afternoon pending negative covid result. Patient is informed that she has been referred to IRTS placements and has been provided the contact information. Patient is in the process of having her ACT team transferred from [facility] in [facility] to [facility]. Writer has provided update to both ACT teams about patient's discharge plan. Writer has provided the contact information to both ACT teams to patient.     Writer faxed negative covid results to [facility].   Writer faxed discharge summary to Olinda with [facility].     Pt has the following outpatient appointment(s) scheduled: ACT team is in the process of changing from [facility] in [facility] to the [facility] ACT team.   The contact for [facility] ACT :  Brianda Vyas  Assertive Community Treatment (ACT) Team  [facility], [place] [address]  Tel [phone]  Fax [phone]     You can reach your [facility] ACT team for questions at:  Olinda: Olinda Marcos: [phone], LUIS RN: Sandra: [phone]        You have been referred to the following IRTS programs and are currently on wait lists:  [facility]:          Rambo Gomez, [facility], [facility]            Phone: [phone]               [facility]: [address]  Phone: [phone]        [facility]         [address] [facility] [address]            Phone: [phone]            [place]:          [address] [address]            Phone: [phone]           Pt has the following professional community support(s): [facility]    Legal Status:  Vol    Diagnosis/Procedure:   Patient Active Problem List   Diagnosis     Postpartum depression     Anxiety     Bipolar disorder, current episode manic severe with psychotic features (H)     Borderline personality disorder (H)     Methamphetamine use disorder, severe (H)     PTSD (post-traumatic stress disorder)     Schizoaffective schizophrenia (H)     Substance abuse (H)     Tobacco use disorder     Suicidal ideation     Altered mental status     Cannabis abuse     Psychosis (H)     Schizophrenia, schizoaffective, chronic with acute exacerbation (H)         Shelby Nguyen, [facility], 3/17/2022, 9:52 AM

## 2022-03-17 NOTE — PLAN OF CARE
Problem: Behavioral Health Plan of Care  Goal: Adheres to Safety Considerations for Self and Others  Outcome: Ongoing, Progressing      Problem: Sleep Disturbance  Goal: Adequate Sleep/Rest  Outcome: Ongoing, Progressing     No c/o pain or discomfort this shift. Pt. Slept greater than 7 hours. Safety checks completed every 15 minutes per policy.  No behaviors noted this shift.
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
## 2022-03-17 NOTE — DISCHARGE INSTRUCTIONS
Behavioral Discharge Planning and Instructions    Summary: You were admitted on 3/9/2022  due to Confusion, Schizophrenia.  You were treated by Dr. Richards and discharged on 3/17/2022 from [facility] to [facility]: [address] [place], [place] [address]k[place] [address]   Phone: [phone]    Main Diagnosis: Schizophrenia    Health Care Follow-up:   Follow up with your ACT team for ongoing medication management.   You are in the process of being transferred to the [facility] team. Tel [phone]  You can reach your [facility] ACT team for questions at:  Olinda: Olinda Rodríguez: [phone], LUIS RN: Sandra: [phone]      Attend all scheduled appointments with your outpatient providers. Call at least 24 hours in advance if you need to reschedule an appointment to ensure continued access to your outpatient providers.     Major Treatments, Procedures and Findings:  You were provided with: a psychiatric assessment, assessed for medical stability, medication evaluation and/or management and group therapy    Symptoms to Report: feeling more aggressive, increased confusion, losing more sleep, mood getting worse or thoughts of suicide    Early warning signs can include: increased depression or anxiety sleep disturbances increased thoughts or behaviors of suicide or self-harm  increased unusual thinking, such as paranoia or hearing voices    Safety and Wellness:  Take all medicines as directed.  Make no changes unless your doctor suggests them.     Follow treatment recommendations.  Refrain from alcohol and non-prescribed drugs.  Ask your support system to help you reduce your access to items that could harm yourself or others.If there is a concern for safety, call 061.    Resources:   [facility] Crisis Response - [phone]  Crisis Intervention: 621.886.4048 or 653-295-1625 (TTY: 229.960.2082).  Call anytime for help.  National [place] on Mental Illness (www.[place].lou.org): 706.948.3201 or  651.220.9818.    Your ACT team is in the process of changing from [facility] to the [facility] ACT team.   The contact for [facility] ACT :  Brianda Vyas  Assertive Community Treatment (ACT) Team  [address], [place] [address]  Tel [phone]  Fax [phone]    You can reach your [facility] ACT team for questions at:  Olinda Marcos: [phone], ACT RN: Sandra: [phone]      You have been referred to the following IRTS programs and are currently on wait lists:  [facility]:   Rambo Gomez, [facility], [facility]         Phone: [phone]          [facility]: Greta Rebollar [place]  Phone: [phone]      [facility]:   [address] [facility] [address]         Phone: [phone]          [facility]:   [address] [facility] [address]         Phone: [phone]            General Medication Instructions:   See your medication sheet(s) for instructions.   Take all medicines as directed.  Make no changes unless your doctor suggests them.   Go to all your doctor visits.  Be sure to have all your required lab tests. This way, your medicines can be refilled on time.  Do not use any drugs not prescribed by your doctor.  Avoid alcohol.    Advance Directives:   Scanned document on file with [facility]? No scanned doc  Is document scanned? Pt states no documents  Honoring Choices Your Rights Handout: Informed and given  Was more information offered? Pt declined    The Treatment team has appreciated the opportunity to work with you. If you have any questions or concerns about your recent admission, you can contact the unit which can receive your call 24 hours a day, 7 days a week. They will be able to get in touch with a Provider if needed. The unit number is *** .

## 2022-04-28 ENCOUNTER — TRANSFERRED RECORDS (OUTPATIENT)
Dept: HEALTH INFORMATION MANAGEMENT | Facility: CLINIC | Age: 42
End: 2022-04-28

## 2022-04-28 ENCOUNTER — HOSPITAL ENCOUNTER (EMERGENCY)
Facility: HOSPITAL | Age: 42
Discharge: HOME | End: 2022-04-29
Attending: EMERGENCY MEDICINE | Admitting: EMERGENCY MEDICINE
Payer: COMMERCIAL

## 2022-04-28 DIAGNOSIS — F29 PSYCHOSIS, UNSPECIFIED PSYCHOSIS TYPE (H): ICD-10-CM

## 2022-04-28 DIAGNOSIS — R46.89 DISORGANIZED BEHAVIOR: ICD-10-CM

## 2022-04-28 LAB
ALT SERPL-CCNC: 17 U/L
AST SERPL-CCNC: 21 U/L
CREATININE (EXTERNAL): 0.9 MG/DL (ref 0.5–1)
GFR ESTIMATED (EXTERNAL): 83 ML/MIN/1.73M2
GLUCOSE (EXTERNAL): 91 MG/DL (ref 74–100)
POTASSIUM (EXTERNAL): 4.5 MMOL/L (ref 3.5–5.1)
TSH SERPL-ACNC: 2.84 UIU/ML (ref 0.35–4.94)

## 2022-04-28 PROCEDURE — 96361 HYDRATE IV INFUSION ADD-ON: CPT

## 2022-04-28 PROCEDURE — 96360 HYDRATION IV INFUSION INIT: CPT

## 2022-04-28 PROCEDURE — 81025 URINE PREGNANCY TEST: CPT | Performed by: EMERGENCY MEDICINE

## 2022-04-28 PROCEDURE — 258N000003 HC RX IP 258 OP 636: Performed by: EMERGENCY MEDICINE

## 2022-04-28 PROCEDURE — 99283 EMERGENCY DEPT VISIT LOW MDM: CPT | Mod: 25

## 2022-04-28 RX ORDER — SODIUM CHLORIDE 9 MG/ML
INJECTION, SOLUTION INTRAVENOUS CONTINUOUS
Status: DISCONTINUED | OUTPATIENT
Start: 2022-04-28 | End: 2022-04-29 | Stop reason: HOSPADM

## 2022-04-28 RX ADMIN — SODIUM CHLORIDE: 9 INJECTION, SOLUTION INTRAVENOUS at 22:19

## 2022-04-28 ASSESSMENT — ENCOUNTER SYMPTOMS: CONFUSION: 1

## 2022-04-29 VITALS
OXYGEN SATURATION: 99 % | BODY MASS INDEX: 20.83 KG/M2 | HEART RATE: 95 BPM | WEIGHT: 125 LBS | DIASTOLIC BLOOD PRESSURE: 100 MMHG | SYSTOLIC BLOOD PRESSURE: 128 MMHG | HEIGHT: 65 IN | RESPIRATION RATE: 18 BRPM

## 2022-04-29 LAB
AMPHETAMINES UR QL SCN: ABNORMAL
BARBITURATES UR QL: ABNORMAL
BENZODIAZ UR QL: ABNORMAL
CANNABINOIDS UR QL SCN: ABNORMAL
COCAINE UR QL: ABNORMAL
CREAT UR-MCNC: 27 MG/DL
HCG UR QL: NEGATIVE
INTERNAL QC OK POCT: NORMAL
METHADONE UR QL SCN: ABNORMAL
OPIATES UR QL SCN: ABNORMAL
OXYCODONE UR QL: ABNORMAL
PCP UR QL SCN: ABNORMAL
POCT KIT EXPIRATION DATE: NORMAL
POCT KIT LOT NUMBER: NORMAL

## 2022-04-29 PROCEDURE — 80307 DRUG TEST PRSMV CHEM ANLYZR: CPT | Performed by: EMERGENCY MEDICINE

## 2022-04-29 PROCEDURE — 250N000013 HC RX MED GY IP 250 OP 250 PS 637: Performed by: EMERGENCY MEDICINE

## 2022-04-29 RX ORDER — ARIPIPRAZOLE 15 MG/1
15 TABLET ORAL ONCE
Status: COMPLETED | OUTPATIENT
Start: 2022-04-29 | End: 2022-04-29

## 2022-04-29 RX ADMIN — ARIPIPRAZOLE 15 MG: 15 TABLET ORAL at 09:07

## 2022-04-29 NOTE — ED NOTES
"4/28/2022  Rubi Sepulveda 1980     [facility] Crisis Assessment    Patient was assessed: remote  Patient location: [facility] ED  Was a release of information signed: No. Reason: Pt needing to sign    Assessment Details  Patient interview started at: 8:16AM and completed at: 8:25AM.    A DEC assessment was requested for this pt to assess current level of functioning after allowing pt to sleep. Pt was sleeping at time of assessment with lights turned off. Staff helped turn lights on and get pt ready for the assessment. At which time pt states she was starting to have a panic attack and was feeling anxious because she didn't want to participate in the DEC assessment at this time. Pt states, \"this will undo all of the healing I have begun to do\".    Pt states she needs more sleep in order to talk with someone. This is similar to what pt did when this [facility] attempted to assess her on 03/09/2022 for similar complaints.  DEC assessment at this time has been canceled.    Plan: Per consult with Dr Singh pt will be given 15 mgs abilify and will continue to rest to see if this helps clear pt's symptoms. If symptoms do not improve a new DEC request will be requested by ED staff by calling [phone].      If symptoms improve and pt is cleared for discharge below is a list of HARRIET resources for the pt:      Other options for CD eval:  Community HARRIET evaluations   For clients without insurance, please see the income limitations to determine eligibility. Clients can all their county for a full list of providers.   Availability and services subject to change, please call to confirm.      [facility] Appointments and walk-ins   [address] [place] [address]   Phone: [phone]      [facility]   Appointments only   Locations: [place], [place], [place], [place], [facility]   Phone: [phone]      [facility]   Appointments only   Monday-Saturday   [address]   [place], [place] " [address]   Phone: [phone]      [facility]   Appointments only   [address] [place] [address]   Phone: [phone]      [facility]   Appointments only   Locations: [place], [facility]   Phone: [phone]      [facility]   Appointments and walk-ins   [address] [place] [address]   Phone: [phone]      [facility] Appointments only   Virtual + Locations: [place], [place], [place], [place], [facility], [place], [place], [place]  Phone: [phone] or [phone]   [email or web address]     [facility]   Appointments only   Locations: [place], [place]   Phone: [phone]      [facility]   Appointments only   Multiple locations in [place]   Phone: [phone]  [email or web address]     [facility]   Virtual + Locations: [place], [place], [place], [place], [place], [place], [place], [place], [place], [place], [place], [place], [place], [place], [place], [place], [place], [place], [place], [place], [place], [place], [place], [facility], [place], [place], [place], [place], [place], [place]   Phone: [phone] or [phone]   [email or web address]     [facility] Appointments and walk-ins   Monday-Friday walk ins:   [address] [place] [address]   Saturday walk-ins:   [address] [place] [address] Phone: [phone]      [place]   Appointments and walk-ins   [address] [facility] [facility] [address]   [place] [address]   Phone: [phone]      [facility]   Appointments only   Locations in [place], [place], [place], [place], [place]   Phone: [phone]     DIRECT ACCES THROUGH [facility] INFORMATION -   [place] - [phone]  [place] - [phone]  [place] - [phone]  formerly [facility] [phone]  [place] - [phone]  Maximilian Sutton  [phone]  [place] - [phone]  [place] - [phone]

## 2022-04-29 NOTE — ED TRIAGE NOTES
"Pt dropped off at the [facility] but is not ; delirious, delusional, states that she is a \"prisoner of war\" and that she will not  the paramedic transporting her; EMS started IV and gave 5mg of droperidol.      Triage Assessment     Row Name 04/28/22 5093       Triage Assessment (Adult)    Airway WDL WDL       Respiratory WDL    Respiratory WDL WDL       Skin Circulation/Temperature WDL    Skin Circulation/Temperature WDL WDL       Cardiac WDL    Cardiac WDL WDL       Peripheral/Neurovascular WDL    Peripheral Neurovascular WDL WDL       Cognitive/Neuro/Behavioral WDL    Cognitive/Neuro/Behavioral WDL WDL              "

## 2022-04-29 NOTE — ED NOTES
Patient awake. Asked her if she would like some lunch. Menu given to her and food order called in at 1250 pm. Pt was asked if she needed anything else and she declined. She was calm, cooperative and polite during this time. After leaving room, she turned on her side and appears to be sleeping. Continue to observe.
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
## 2022-04-29 NOTE — ED NOTES
Discharged:       04/29/22 155   Departure Condition   Departure Condition Stable   Mobility at Departure Ambulatory  (cab called for)   Departure Mode By self   Vital Signs   BP (!) 128/100   Patient Position Sitting   Pulse 95   Pulse Rate Source Monitor   Resp 18   SpO2 99 %   O2 Device None (Room air)   Gloria Coma Scale   Best Eye Response 4-->(E4) spontaneous   Best Motor Response 6-->(M6) obeys commands   Best Verbal Response 5-->(V5) oriented   [place] Coma Scale Score 15

## 2022-04-29 NOTE — ED PROVIDER NOTES
"[facility] ED Mental Health Handoff Note:     Assuming care from: Dr Aguilar Herman    Brief HPI: 42 year old female signed out to me by the above provider. See initial ED Provider note for full details of the presentation.   In brief, patient believes she is a \"prisoner of war\" [facility]  or a . [facility] and is delirious and delusional. In the ED patient says she is tired and does not elaborate much upon additional questioning. She denies any alcohol or drug use, medical problems, or pregnancy.     Home meds reviewed and ordered/administered: No  Medically stable for inpatient mental health admission: Yes.  Evaluated by mental health: No. Awaiting clinical sobriety before evaluation.  Safety concerns: At the time I received sign out, there were no safety concerns.    Labs/Imaging:   Results for orders placed or performed during the hospital encounter of 04/28/22 (from the past 24 hour(s))   HCG qualitative urine POCT     Status: Normal    Collection Time: 04/29/22  7:05 AM   Result Value Ref Range    HCG Qual Urine Negative Negative    Internal QC Check POCT Valid Valid    POCT Kit Lot Number [id number]     POCT Kit Expiration Date 3/31/2023    Urine Drugs of Abuse Screen Panel 1+ - Drug Screen plus Methadone     Status: Abnormal    Collection Time: 04/29/22  7:23 AM    Narrative    The following orders were created for panel order Urine Drugs of Abuse Screen Panel 1+ - Drug Screen plus Methadone.  Procedure                               Abnormality         Status                     ---------                               -----------         ------                     Drugs of Abuse 1+ Panel,...[[id number]]  Abnormal            Final result                 Please view results for these tests on the individual orders.   Drugs of Abuse 1+ Panel, Urine ([facility] Only)     Status: Abnormal    Collection Time: " 04/29/22  7:23 AM   Result Value Ref Range    Amphetamines Urine Screen Positive (A) Screen Negative    Benzodiazepines Urine Screen Negative Screen Negative    Opiates Urine Screen Negative Screen Negative    PCP Urine Screen Negative Screen Negative    Cannabinoids Urine Screen Positive (A) Screen Negative    Barbiturates Urine Screen Negative Screen Negative    Cocaine Urine Screen Negative Screen Negative    Methadone Urine Screen Negative Screen Negative    Oxycodone Urine Screen Negative Screen Negative    Creatinine Urine mg/dL 27 mg/dL    Narrative    Drug                           Screening Threshold    Amphetamines                    1000 ng/mL  Benzodiazepine                   200 ng/mL  Opiates                          300 ng/mL  Phencyclidine                     25 ng/mL  THC Metabolite                    50 ng/mL  Barbiturates                     200 ng/mL  Cocaine Metabolite               150 ng/mL  Methadone                        300 ng/mL  Oxycodone                        100 ng/mL    Screening results are to be used only for medical purposes.  Unconfirmed screening results are not to be used for non-  medical purposes.         ED Meds:  Medications   ARIPiprazole (ABILIFY) tablet 15 mg (15 mg Oral Given 4/29/22 0907)     No orders to display       ED Course:  7:00 - 9:00 AM Patient is a 42-year-old female with a history of anxiety, schizoaffective disorder, amphetamine abuse, PTSD, presents to the emergency department with anxiety and agitation overnight.  She was fairly disorganized overnight.  Her urine toxicology is positive for cannabinoids and amphetamine.  She seemed to have some improvement overnight, was able to have a conversation with me this morning.  She is oriented to self place and time, but not fully to situation.  She is cooperative, labile but redirectable.  She did not engage much with initial social work assessment here this morning.  She continues to exhibit some delusional  "behavior.  When I was talking to her this morning.  She said I \"do not want to look at you, I will not  you \"she request transfer to [facility].  She says that she is [facility] but still has spirits that talk to her frequently.  When asked directly about suicidality, she says \"I do not know, sometimes I think about hurting myself\".  She does not have any plan. She later mentions to DEC and nursing that she is not suicidal.  I think much of this could be attributable to substance-induced psychosis.  I reviewed the case again with crisis social work.  Given her pattern of hospitalization for typically less than 24 to 48 hours, limited engagement with psychiatry and social work, I think it is reasonable to continue monitoring her here.  We will try to give her her Abilify if she will take it as she has had some good improvement with this in the past.  I am not sure how truly off of her baseline she is now this morning however given that she still seems somewhat disorganized, we will continue to monitor in the emergency department.  If persistently disorganized may require admission however if improving may be able to discharge home.    2:40 PM I have rechecked on the patient.  She is resting comfortably having received her Abilify several hours ago.  She no longer seems to be having any overt hallucinations or severe delusional behavior.  She is most concerned right now about not having anywhere to go and being homeless.  I did discuss things with care management and we are going to try to get her a bed at the shelter/safe space for this evening and arrange a ride there for her.  I do not think she is severely decompensated from a psychiatric standpoint any further.  I think a lot of her presentation was related to substance use.  She was provided with additional resources from her crisis counselor this morning.  She was discharged in good condition.      Impression:    ICD-10-CM    1. Disorganized behavior  R46.89    2. " Psychosis, unspecified psychosis type (H)  F29          [facility] EMERGENCY DEPARTMENT  [facility] [facility] [id number] [phone]                 Blake Singh MD  04/29/22 1904       Blake Singh MD  04/29/22 190

## 2022-04-29 NOTE — ED NOTES
Discharge plan in progress.  CM looking for shelter bed.  OK to be off of close observation, per ER MD.

## 2022-04-29 NOTE — ED PROVIDER NOTES
"  NAME: Rubi Sepulveda  AGE: 42 year old female  YOB: 1980  MRN: [id number]  EVALUATION DATE & TIME: 2022  9:33 PM    PCP: No Ref-Primary, Physician    ED PROVIDER: Mihir Herman M.D.    Chief Complaint   Patient presents with     Mental Health Problem     Delusional, \"prisoner of war\", delirious     FINAL IMPRESSION:  1. Disorganized behavior    2. Psychosis, unspecified psychosis type (H)      MEDICAL DECISION MAKIN:52 PM I met with the patient, obtained history, performed an initial exam, and discussed options and plan for diagnostics and treatment here in the ED.   Patient was clinically assessed and consented to treatment. After assessment, medical decision making and workup were discussed with the patient. The patient was agreeable to plan for testing, workup, and treatment.  Pertinent Labs & Imaging studies reviewed. (See chart for details)  10:38 PM I spoke with the DEC .  12:12 AM Nursing reports the patient is not willing to speak to DEC, but may be willing to do so in the morning.  5:00 AM patient signed out to Dr. Mcgrath for ultimate disposition once able to be reassessed     Rubi Sepulveda is a 42 year old female who presents with mental health problem.   Differential diagnosis includes but not limited to methamphetamine abuse, acute psychosis, paranoid schizophrenia, keke.  Patient coming from the [facility] where she had somehow walked into though she does not recall she reports.  She denies any suicidal or homicidal ideation and did have laboratory work-up [facility]  Labs were otherwise unremarkable including negative influenza and COVID.  Patient here sleepy but still slightly disorganized thoughts.  Patient did receive droperidol from EMS due to agitation.  Patient on track for medical officer hold at this time will remain in the ER until she can clear from the droperidol and be assessed for acute psychosis.  Patient does have a " "history of methamphetamine abuse per history and reportedly is not a  but had somehow arrived at the [facility].  Plan will be to watch patient in the ER until she clears from possible drug-induced psychosis versus acute mental health breakdown and plan for assessment in the morning by DEC.  The DEC  did try to speak to patient however she was still quite sleepy from the droperidol and plan will be to watch patient for clearance or acute psychiatric need for admission.    0 minutes of critical care time    MEDICATIONS GIVEN IN THE EMERGENCY:  Medications   sodium chloride 0.9% infusion ( Intravenous Rate/Dose Verify 4/29/22 0116)       NEW PRESCRIPTIONS STARTED AT TODAY'S ER VISIT:  New Prescriptions    No medications on file          =================================================================    HPI  HPI limited secondary to psychiatric disorder.    Patient information was obtained from: Triage note, patient    Use of : N/A        Rubi Sepulveda is a 42 year old female with a past medical history of schizophrenia, Bipolar disorder, borderline personality disorder, psychosis, drug use including methamphetamine and cannabis who presents by EMS for the evaluation of mental health problem. Per triage note, the [facility] believes she is a \"[facility]er of war\", but she is not actually in the  or a . Patient is delirious and delusional. Note also reports patient will not  the paramedic transporting her. Patient was given droperidol 5 mg IV by EMS.    Per the patient, she states [facility] not [facility]. Patient reports she is tired and wants a drink of water. Patient will not elaborate much further than this and closes her eyes when asked any additional questions. She denies any alcohol or drug use, medical problems, or current pregnancy.    Per chart review, patient was admitted to [facility] from " "03/09/2022 - 03/17/2022. Patient was admitted as she was initially very disorganized and delusional stating she did not remember the last 5 years of her life and replied \"I can't remember\" to the majority of the questions asked. She was resumed on Abilify which she tolerated well and had a gradual improvement on this. By time of discharge, she was much more organized and and able to participate in treatment planning.  coordinated her treatment with her ACT team. Patient did have a similar episode in 2020.    REVIEW OF SYSTEMS   Review of Systems   Unable to perform ROS: Psychiatric disorder   Psychiatric/Behavioral: Positive for confusion.        Positive for \"delirious\", \"delusional\".        PAST MEDICAL HISTORY:  No past medical history on file.    PAST SURGICAL HISTORY:  No past surgical history on file.    CURRENT MEDICATIONS:      Current Facility-Administered Medications:      sodium chloride 0.9% infusion, , Intravenous, Continuous, Mihir Herman MD, Last Rate: 125 mL/hr at 04/29/22 0116, Rate Verify at 04/29/22 0116    Current Outpatient Medications:      ARIPiprazole (ABILIFY) 15 MG tablet, Take 1 tablet (15 mg) by mouth daily, Disp: 30 tablet, Rfl: 0    ALLERGIES:  Allergies   Allergen Reactions     Ibuprofen Nausea and Vomiting     Seasonal Allergies        FAMILY HISTORY:  No family history on file.    SOCIAL HISTORY:   Social History     Socioeconomic History     Marital status: Single   Tobacco Use     Smoking status: Current Some Day Smoker     Packs/day: 0.50   Substance and Sexual Activity     Alcohol use: Yes     Alcohol/week: 3.0 standard drinks     Drug use: Not Currently     Types: Other     Comment: Drug use: ASHVIN     Sexual activity: Yes     Partners: Male     Birth control/protection: None       PHYSICAL EXAM:    Vitals: /66   Pulse 85   Resp 20   Ht 1.651 m (5' 5\")   Wt 56.7 kg (125 lb)   SpO2 97%   BMI 20.80 kg/m     Physical Exam  Vitals and nursing " note reviewed.   Constitutional:       General: She is sleeping. She is not in acute distress.     Appearance: Normal appearance. She is normal weight. She is not ill-appearing or toxic-appearing.   HENT:      Head: Normocephalic and atraumatic.   Eyes:      Pupils: Pupils are equal, round, and reactive to light.   Cardiovascular:      Rate and Rhythm: Normal rate and regular rhythm.      Heart sounds: Normal heart sounds.   Pulmonary:      Effort: Pulmonary effort is normal. No respiratory distress.      Breath sounds: Normal breath sounds.   Abdominal:      Palpations: Abdomen is soft.      Tenderness: There is no abdominal tenderness. There is no guarding or rebound.   Musculoskeletal:         General: No signs of injury. Normal range of motion.   Skin:     General: Skin is warm and dry.      Coloration: Skin is not jaundiced.   Neurological:      Cranial Nerves: No cranial nerve deficit.      Coordination: Coordination normal.   Psychiatric:         Speech: Speech is delayed.         Behavior: Behavior is slowed. Behavior is cooperative.         Thought Content: Thought content is delusional. Thought content does not include homicidal or suicidal ideation.         Cognition and Memory: Cognition is impaired.           LAB:  All pertinent labs reviewed and interpreted.  Labs Ordered and Resulted from Time of ED Arrival to Time of ED Departure - No data to display    RADIOLOGY:  No orders to display     PROCEDURES:   Procedures       Mihir Herman M.D.  Emergency Medicine  [facility] Emergency Department     Mihir Herman MD  04/29/22 5805

## 2022-04-29 NOTE — ED NOTES
"Had DEC assessment.  OK to taking Abilify.  Sleeping. Wakes up to voice.    Not interested in breakfast when offered.  VS obtained: /66   Pulse 85   Resp 20   Ht 1.651 m (5' 5\")   Wt 56.7 kg (125 lb)   SpO2 97%   BMI 20.80 kg/m     S/L'd  Plan: per GARY CASTELLANO.      "

## 2022-04-29 NOTE — ED NOTES
Patient assisted to bathroom. Lunch arrived and she is sitting on side of bed. Continues to be calm and cooperative.

## 2022-04-29 NOTE — ED NOTES
Advised charge nurse and provider of pt psychosis; pt is in hallway 3 bed; EMS administered 5mg of droperidol so pt is resting at this time.

## 2022-04-29 NOTE — ED NOTES
Bed: JNEDH-03  Expected date: 4/28/22  Expected time: 9:28 PM  Means of arrival: Ambulance  Comments:  Garrett BachF  AMS

## 2022-04-29 NOTE — DISCHARGE INSTRUCTIONS
You were seen today in the emergency department at [facility] for a flareup of anxiety and PTSD symptoms with some disorganized behavior.  We are glad that you are feeling better.  We gave you your dose of Abilify which is probably helping with some of your symptoms.  If you have concerns about drugs and alcohol, consider seeking further care as an outpatient. You can continue to use Abilify and follow-up with mental health practitioners to review any ongoing concerns about PTSD, anxiety, or hallucinations.      Other options for CD eval:  Community HARRIET evaluations   For clients without insurance, please see the income limitations to determine eligibility. Clients can all their county for a full list of providers.   Availability and services subject to change, please call to confirm.      [facility] Appointments and walk-ins   [address] [place] [address]   Phone: [phone]      [facility]   Appointments only   Locations: [facility], [place], [place], [facility]   Phone: [phone]      [facility]   Appointments only   Monday-Saturday   [address]   [place] [address]   Phone: [phone]      [facility]   Appointments only   [address] [place] [address]   Phone: [phone]      [facility]   Appointments only   Locations: [facility]   Phone: [phone]      [facility]   Appointments and walk-ins   [address] [place] [address]   Phone: [phone]      [facility] Appointments only   Virtual + Locations: [place], [place], [place], [place], [facility] [place], [place]  Phone: [phone] or [phone]   [email or web address]     [facility]   Appointments only   Locations: [place]   Phone: [phone]      [facility]   Appointments only   Multiple locations in  [facility]   Phone: [phone]  [email or web address]     [facility]   Virtual + Locations: [place], [place], [place], [place], [place], [place], [place], [place], [place], [place], [place], [place], [place], [place], [place], [place], [place], [place], [place], [place], [place], [place], [place], [facility], [place], [place], [place], [place], [place], [place]   Phone: [phone] or [phone]   [email or web address]     [facility] Appointments and walk-ins   Monday-Friday walk ins:   [address] [place] [address]   Saturday walk-ins:   [address] [address] Phone: [phone]      [place]   Appointments and walk-ins   [address] [place] [address]   Phone: [phone]      [facility]   Appointments only   Locations in [place], [place], [place], [place], [place]   Phone: [phone]     DIRECT ACCES THROUGH COUNTY INFORMATION -   [place] - [phone]  [place] - [phone]  [place] - [phone]  [place] - [phone]  [place] - [phone]  [place] - [phone]  [place] - [phone]  [place] - [phone]

## 2024-09-29 ENCOUNTER — HOSPITAL ENCOUNTER (EMERGENCY)
Facility: CLINIC | Age: 44
Discharge: HOME | End: 2024-09-29
Attending: FAMILY MEDICINE | Admitting: FAMILY MEDICINE
Payer: COMMERCIAL

## 2024-09-29 VITALS
OXYGEN SATURATION: 98 % | RESPIRATION RATE: 16 BRPM | SYSTOLIC BLOOD PRESSURE: 110 MMHG | TEMPERATURE: 98 F | HEART RATE: 68 BPM | DIASTOLIC BLOOD PRESSURE: 62 MMHG

## 2024-09-29 DIAGNOSIS — F15.10 METHAMPHETAMINE USE (H): ICD-10-CM

## 2024-09-29 PROCEDURE — 250N000013 HC RX MED GY IP 250 OP 250 PS 637: Performed by: FAMILY MEDICINE

## 2024-09-29 PROCEDURE — 99285 EMERGENCY DEPT VISIT HI MDM: CPT | Performed by: FAMILY MEDICINE

## 2024-09-29 PROCEDURE — 99284 EMERGENCY DEPT VISIT MOD MDM: CPT | Performed by: FAMILY MEDICINE

## 2024-09-29 RX ORDER — ARIPIPRAZOLE 5 MG/1
5 TABLET ORAL ONCE
Status: COMPLETED | OUTPATIENT
Start: 2024-09-29 | End: 2024-09-29

## 2024-09-29 RX ORDER — HALOPERIDOL 5 MG/1
10 TABLET ORAL ONCE
Status: COMPLETED | OUTPATIENT
Start: 2024-09-29 | End: 2024-09-29

## 2024-09-29 RX ADMIN — HALOPERIDOL 10 MG: 5 TABLET ORAL at 09:55

## 2024-09-29 RX ADMIN — ARIPIPRAZOLE 5 MG: 5 TABLET ORAL at 09:55

## 2024-09-29 ASSESSMENT — ACTIVITIES OF DAILY LIVING (ADL)
ADLS_ACUITY_SCORE: 35

## 2024-09-29 ASSESSMENT — COLUMBIA-SUICIDE SEVERITY RATING SCALE - C-SSRS
2. HAVE YOU ACTUALLY HAD ANY THOUGHTS OF KILLING YOURSELF IN THE PAST MONTH?: NO
1. IN THE PAST MONTH, HAVE YOU WISHED YOU WERE DEAD OR WISHED YOU COULD GO TO SLEEP AND NOT WAKE UP?: NO
6. HAVE YOU EVER DONE ANYTHING, STARTED TO DO ANYTHING, OR PREPARED TO DO ANYTHING TO END YOUR LIFE?: NO

## 2024-09-29 NOTE — DISCHARGE INSTRUCTIONS
Please make an appointment to follow up with [facility] (phone: [phone])  if you have any concerns.    ADULT HARRIET EVALUATION (In-Person)  Date: 10/10/2024  Time: 5:00 PM   Length: 120 Min  Provider: Ajit Denise LADC      Department Location: The Assessment Center is located at the [facility], [facility], on the [facility].    Please come to the [facility] entrance near the Emergency Department.  Follow the signs to the Emergency Room and park in the RED or YELLOW parking ramp.  Enter in the [facility].  The Assessment Center is next to [address].    -If getting a ride, please request drop off at the address above.  -If driving, discounted parking is available in the Red or Yellow ramp across from the [facility] entrance.    Our reception area is conveniently located as you come into the [facility] in [address], next [facility] to [facility].  Please bring a current list of medication and contact information for your other providers.    IMPORTANT: If you need to change your appointment, please call Behavioral Access [phone].    Please note, we do ask for a minimum of a 24-hour notice for canceled or rescheduled appointments.    This appointment is in a hospital-based location.  Before your visit, you may want to check with your insurance company for coverage and referral options, including cost differences between services provided in different clinic settings.  For more information visit this link on the [employer] Website:  [email or web address]    Department Address: [address] [place] [address]   Department Phone: [phone]       Scheduled Appointment  Date: Wednesday, 10/2/2024    Time: 12:00 pm - 1:00 pm  Provider: Edvin Carrion MA, CNP,RN  Location: [facility], [address] [place] [address], [address], [place] [address]  Phone: [phone]  Type: Medication Mgmt - Initial  (In-Person)    Patient instructions  Please fill New Patient Form by using following link. All forms need to be completed 24 hours prior to the appointment date/time by going to [email or web address] Please call us on [phone] 24 hours prior to your scheduled appointment to confirm that you are able to attend. We will provide you information about how to log into video call software when you call.
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
## 2024-09-29 NOTE — CONSULTS
"Diagnostic Evaluation Consultation  Crisis Assessment    Patient Name: Rubi Sepulveda  Age:  44 year old  Legal Sex: female  Gender Identity: female  Pronouns:   Race: White  Ethnicity: Not  or   Language: English      Patient was assessed: In person   Crisis Assessment Start Date: 09/29/24  Crisis Assessment Start Time: 1300  Crisis Assessment Stop Time: 1320  Patient location: [facility] EMERGENCY DEPARTMENT                             [facility]-    Referral Data and Chief Complaint  Rubi Sepulveda presents to the ED via EMS. Patient is presenting to the ED for the following concerns: Verbal agitation, Intoxication, Substance use.   Factors that make the mental health crisis life threatening or complex are: Pt presents to ED for concerns of substance use and erratic behavior. Pt seen in ED earlier today for similar concerns. Pt left ED and used methamphetamine. Was found outside without pants on and heavily intoxicated from methamphetamine. Pt brought to ED. Pt slept in ED for several hours until assessment could be completed. Pt calm and cooperative during assessment. She still appears slightly impaired but she denies that she has been using substances. Pt denies all mental health concerns, although reports she has been hearing voices. Denies they are commanding. Reports she \"has it under control\". Reports being consistent with abilify. Denies SI, HI, NSSIB, substance abuse. Pt continues to state she does not need detox because she is not using substance right now, which appears to be false evidenced by pt's presentation and history. Pt has a care plan in place, read for further detail.       Informed Consent and Assessment Methods  Explained the crisis assessment process, including applicable information disclosures and limits to confidentiality, assessed understanding of the process, and obtained consent to proceed with the assessment.  Assessment methods included conducting a formal " "interview with patient, review of medical records, collaboration with medical staff, and obtaining relevant collateral information from family and community providers when available.  : done     Patient response to interventions: verbalizes understanding  Coping skills were attempted to reduce the crisis:  seeking out ED     History of the Crisis    Hx of schizophrenia, schizoaffective disorder bipolar type, severe methamphetamine use, conduct disorder. Pt has care plan, read for further detail regarding hx and baseline presentation. Denies hx of suicide attempts. Pt is homeless, reports working with [facility] to locate housing and \"should happen soon\".     Brief Psychosocial History  Family:  Single, Children no  Support System:  None  Employment Status:  unemployed  Source of Income:  unable to assess  Financial Environmental Concerns:  unemployed  Current Hobbies:     Barriers in Personal Life:  mental health concerns    Significant Clinical History  Current Anxiety Symptoms:  anxious, excessive worry  Current Depression/Trauma:  difficulty concentrating  Current Somatic Symptoms:     Current Psychosis/Thought Disturbance:  auditory hallucinations, inattentive  Current Eating Symptoms:     Chemical Use History:  Alcohol: None  Benzodiazepines: None  Opiates: None  Cocaine: None  Marijuana: None  Other Use: Methamphetamines  Last Use:: 09/29/24   Past diagnosis:  Substance Use Disorder, Schizophrenia  Family history:  No known history of mental health or chemical health concerns  Past treatment:  Inpatient Hospitalization, Civil Commitment, Primary Care, Psychiatric Medication Management, Case management  Details of most recent treatment:  Unknown  Other relevant history:          Collateral Information  Is there collateral information: No        Risk Assessment  [place] Suicide Severity Rating Scale Full Clinical Version:  Suicidal Ideation  Q1 Wish to be Dead (Lifetime): No  Q2 Non-Specific Active Suicidal " Thoughts (Lifetime): No  Q6 Suicide Behavior (Lifetime): no     Suicidal Behavior (Lifetime)  Actual Attempt (Lifetime): No  Has subject engaged in non-suicidal self-injurious behavior? (Lifetime): No  Interrupted Attempts (Lifetime): No  Aborted or Self-Interrupted Attempt (Lifetime): No  Preparatory Acts or Behavior (Lifetime): No    [place] Suicide Severity Rating Scale Recent:   Suicidal Ideation (Recent)  Q1 Wished to be Dead (Past Month): no  Q2 Suicidal Thoughts (Past Month): no  Level of Risk per Screen: no risks indicated     Suicidal Behavior (Recent)  Actual Attempt (Past 3 Months): No  Has subject engaged in non-suicidal self-injurious behavior? (Past 3 Months): No  Interrupted Attempts (Past 3 Months): No  Aborted or Self-Interrupted Attempt (Past 3 Months): No  Preparatory Acts or Behavior (Past 3 Months): No    Environmental or Psychosocial Events: unstable housing, homelessness, other life stressors, ongoing abuse of substances  Protective Factors: Protective Factors: strong bond to family unit, community support, or employment, help seeking, supportive ongoing medical and mental health care relationships, responsibilities and duties to others, including pets and children    Does the patient have thoughts of harming others? Feels Like Hurting Others: no  Previous Attempt to Hurt Others:  (denies, hx of verbal aggression)  Is the patient engaging in sexually inappropriate behavior?: no    Is the patient engaging in sexually inappropriate behavior?  no        Mental Status Exam   Affect: Flat  Appearance: Disheveled  Attention Span/Concentration: Inattentive  Eye Contact: Avoidant    Fund of Knowledge: Delayed   Language /Speech Content: Non-Fluent  Language /Speech Volume: Soft  Language /Speech Rate/Productions: Minimally Responsive  Recent Memory: Variable  Remote Memory: Variable  Mood: Normal  Orientation to Person: Yes   Orientation to Place: Yes  Orientation to Time of Day: Yes  Orientation to  Date: Yes     Situation (Do they understand why they are here?): Yes  Psychomotor Behavior: Underactive  Thought Content: Clear  Thought Form: Intact          Medication  No current facility-administered medications for this encounter.     Current Outpatient Medications   Medication Sig Dispense Refill    ARIPiprazole (ABILIFY) 15 MG tablet Take 1 tablet (15 mg) by mouth daily 30 tablet 0           Current Care Team  Patient Care Team:  No Ref-Primary, Physician as PCP - General    Diagnosis  Patient Active Problem List   Diagnosis Code    Postpartum depression F53.0    Anxiety F41.9    Bipolar disorder, current episode manic severe with psychotic features (H) F31.2    Borderline personality disorder (H) F60.3    Methamphetamine use disorder, severe (H) F15.20    PTSD (post-traumatic stress disorder) F43.10    Schizoaffective schizophrenia (H) F25.9    Substance abuse (H) F19.10    Tobacco use disorder F17.200    Suicidal ideation R45.851    Altered mental status R41.82    Cannabis abuse F12.10    Psychosis (H) F29    Schizophrenia, schizoaffective, chronic with acute exacerbation (H) F25.9       Primary Problem This Admission    Psychosis F29   Methamphetamine use disorder severe F15.20     Clinical Summary and Substantiation of Recommendations   Pt presents to ED for concerns of intoxication/substance use. Pt was found outside with her pants off and acting erratic. Pt has been using methamphetamines which is exacerbating symptoms. Pt denies any mental health concerns. She denies that she is using substances. Pt reports she just wants a place to sleep. Pt open to transitioning to a shelter. She denies interest in detox. Pt offered a [facility] evaluation and psychiatry appointment to manage hallucinations that pt endorses. Appointments scheduled. Pt denies SI, HI, NSSIB, psychosis. Will discharge to shelter tonight. [facility] walk in center will be offered if no shelter beds available.      Patient coping skills attempted to  reduce the crisis:  seeking out ED    Disposition  Recommended disposition: Substance Abuse Disorder Treatment, Detox        Reviewed case and recommendations with attending provider. Attending Name: Dr. Collins       Attending concurs with disposition: yes       Patient and/or validated legal guardian concurs with disposition:   yes       Final disposition:  discharge    Legal status on admission: Voluntary/Patient has signed consent for treatment    Assessment Details   Total duration spent with the patient: 20 min     CPT code(s) utilized: 01431 - Psychotherapy for Crisis - 60 (30-74*) min    ROSENDA Moreno, Psychotherapist  [facility] - Triage & Transition Services  Callback: [phone]
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
## 2024-09-29 NOTE — ED NOTES
Emergency Department Patient Sign-out       Brief HPI:  This is a 44 year old female signed out to me by Dr. Tinoco .  See initial ED Provider note for details of the presentation.            Significant Events prior to my assuming care: Patient agitated. Needs DEC. Has been acting abnormally, ? Meth use vs psych care.      Exam:   Patient Vitals for the past 24 hrs:   BP Temp Temp src Pulse Resp SpO2   09/29/24 0926 107/75 98  F (36.7  C) Oral 104 18 94 %           ED RESULTS:   No results found for this visit on 09/29/24 (from the past 24 hour(s)).    ED MEDICATIONS:   Medications   haloperidol (HALDOL) tablet 10 mg (10 mg Oral $Given 9/29/24 0955)   ARIPiprazole (ABILIFY) tablet 5 mg (5 mg Oral $Given 9/29/24 0955)         Impression:  No diagnosis found.    Plan:    Patient discharged to shelter.        MD Dennis Ibarra, Juan Francisco Gamble MD  09/29/24 1159

## 2024-09-29 NOTE — ED NOTES
Attempted to see pt at 12:15 pm on 9/29/24. Pt non-responsive, unable to arouse. Please call [facility] when pt is appropriate for assessment. Attending informed.

## 2024-09-30 ENCOUNTER — TELEPHONE (OUTPATIENT)
Dept: BEHAVIORAL HEALTH | Facility: CLINIC | Age: 44
End: 2024-09-30
Payer: COMMERCIAL

## 2024-09-30 NOTE — TELEPHONE ENCOUNTER
Triage and Transition Services- Patient Follow Up Call  Service Line Making Phone Call: Telemedicine    Who did Writer Talk to: Patient    Details of Call: Number is not in service.    Maggy Guardado 9/30/2024 2:38 PM